# Patient Record
Sex: FEMALE | Race: WHITE | Employment: FULL TIME | URBAN - METROPOLITAN AREA
[De-identification: names, ages, dates, MRNs, and addresses within clinical notes are randomized per-mention and may not be internally consistent; named-entity substitution may affect disease eponyms.]

---

## 2017-08-25 ENCOUNTER — OFFICE VISIT (OUTPATIENT)
Dept: FAMILY MEDICINE CLINIC | Age: 32
End: 2017-08-25

## 2017-08-25 VITALS
SYSTOLIC BLOOD PRESSURE: 131 MMHG | DIASTOLIC BLOOD PRESSURE: 78 MMHG | HEART RATE: 85 BPM | RESPIRATION RATE: 18 BRPM | HEIGHT: 67 IN | TEMPERATURE: 96.9 F | OXYGEN SATURATION: 99 % | WEIGHT: 289 LBS | BODY MASS INDEX: 45.36 KG/M2

## 2017-08-25 DIAGNOSIS — E55.9 VITAMIN D DEFICIENCY: ICD-10-CM

## 2017-08-25 DIAGNOSIS — N93.8 DUB (DYSFUNCTIONAL UTERINE BLEEDING): ICD-10-CM

## 2017-08-25 DIAGNOSIS — D50.9 IRON DEFICIENCY ANEMIA, UNSPECIFIED IRON DEFICIENCY ANEMIA TYPE: ICD-10-CM

## 2017-08-25 DIAGNOSIS — E78.5 DYSLIPIDEMIA: ICD-10-CM

## 2017-08-25 DIAGNOSIS — Z00.00 LABORATORY EXAM ORDERED AS PART OF ROUTINE GENERAL MEDICAL EXAMINATION: ICD-10-CM

## 2017-08-25 DIAGNOSIS — R05.9 COUGH: ICD-10-CM

## 2017-08-25 DIAGNOSIS — E03.1 CONGENITAL HYPOTHYROIDISM WITHOUT GOITER: ICD-10-CM

## 2017-08-25 PROBLEM — R42 VERTIGO: Status: ACTIVE | Noted: 2017-08-25

## 2017-08-25 RX ORDER — ASCORBIC ACID 250 MG
TABLET ORAL
COMMUNITY
End: 2020-02-25 | Stop reason: ALTCHOICE

## 2017-08-25 RX ORDER — ALBUTEROL SULFATE 90 UG/1
2 AEROSOL, METERED RESPIRATORY (INHALATION)
Qty: 1 INHALER | Refills: 0 | Status: SHIPPED | OUTPATIENT
Start: 2017-08-25 | End: 2017-10-23

## 2017-08-25 NOTE — PROGRESS NOTES
.. [address] Visit   Patient ID:   Shira Stover is a 34 y.o. female. Assessment/Plan:    Diagnoses and all orders for this visit:    Counseled on lifestyle change for wt loss. Labs as ordered. Not currently interested in [facility] Program.    Medications refilled. Eval menses. Diagnoses and all orders for this visit:    1. BMI 45.0-49.9, adult (HCC)  -     HEMOGLOBIN A1C WITH EAG  -     LIPID PANEL  -     TSH AND FREE T4    2. Cough  -     albuterol (PROVENTIL HFA, VENTOLIN HFA, PROAIR HFA) 90 mcg/actuation inhaler; Take 2 Puffs by inhalation every four (4) hours as needed for Wheezing or Shortness of Breath (cough). 3. DUB (dysfunctional uterine bleeding)  -     PROLACTIN  -     FSH AND LH  -     17-OH PROGESTERONE LCMS  -     TESTOSTERONE, FREE & TOTAL  -     INSULIN-LIKE GROWTH FACTOR 1    4. Vitamin D deficiency  -     VITAMIN D, 25 HYDROXY    5. Dyslipidemia    6. Iron deficiency anemia, unspecified iron deficiency anemia type  -     CBC W/O DIFF    7. Congenital hypothyroidism without goiter    8. Laboratory exam ordered as part of routine general medical examination  -     METABOLIC PANEL, COMPREHENSIVE    Other orders  -     CVD REPORT        See patient instructions for more. Counselled pt on:  Patient health concerns, plan as outlined in patient instructions and above. Patient was offered a choice/choices in the treatment plan today. Patient expresses understanding of the plan and agrees with recommendations. More than 50% of this >30 minute encounter was spent in counseling and coordination of care today. Patient Instructions   TODAY, please go to:   CHECK OUT    LAB    Please schedule the following appointments at CHECK OUT:  · Weight follow up with Dr. Dragan Kate in 4-6 weeks    Today's Plan:  - Flu season is coming! Remember to get your flu vaccine! Set SMART Goals for food changes. See sheeet          The following can help you improve your weight. 2. Food Choices  · Be Mindful of CARBOHYDRATES. Carbohydrates= sugars. This means breads, pasta, rice, chips, desserts, starchy vegetables, etc.  · Decrease how OFTEN you have carbohydrates  · Decrease how MUCH carbohydrates you eat at one time  · Choose carbs that are better for you, like whole grain. · Try to eliminate sugar from your drinks. (Soda, sweet tea, lemonade, juice, gatorade, alcohol, etc). · Eat more fruits and vegetables  · Eat protein and healthy fats  · Eat regularly  · Count calories if needed  · Let Dr. Bassem Ornelas know if you would like to see Nutrition to discuss more  3. Physical Activity  · Move more. · Try to walk 150 minutes per week. · Try to walk 10,000 steps per day  · Consider counting your steps on your smart phone. 4. Medication  · For some people medication is helpful. Let Dr. Bassem Ornelas know if you would like to discuss this. If prescribed medication, please take as advised. Find a way to keep your food choices accountable  - calorie counting  - portion measures  - meal     Subjective:   HPI:  Anamaria Oswald is a 34 y.o. female being seen for:   Chief Complaint   Patient presents with   [address] Weight Loss      is Katia Fernandes RN at [facility]  Past medical history, surgical history, social history, family history, medications, allergies reviewed and updated. See below for more detail. Weight, menses  · Abnormal menses again  · H/o thyroid d/o.  was on synthroid in the past, started after having son in 2009. Stopped around 2015/16. · Has always struggled with weight  · Will have spurts of success, then worse with stressors like grandparent passing away, then loose focuses. Cycle recurs. Trigger recently: court re: custody of older son, lost  · Started treadmill in last few weeks with   · Eats her feelings  · Tries to recenter and refocus  · Before pregnancy was 250. Would like to get back to 250. Has been over 200 since puberty. Wants to be healthier and more active. · In past with Dr. Barry Render tried phentermine with some success. Lost 30-35lbs. · Max adult weight: 296 when pregnant, 292 not pregnant. · Physical activity:   · Maybe park a few times over summer, mild walking  · Mostly sedentary  · At desk at work  · Sits at home  · Last two weeks, treadmill 3-4 times per week, 30 min. Stretching. Yoga twice a week to help with stress  · Goals: considering gym at work for lunch breaks  · Food:   · Knows she eats a lot of carbs and loves it  · At least two servings of vegetables a day  · Breakfast: coffee, egg muffin or muffin  · Sometimes AM snack  · Lunch: turkey and cheese sand, veggie, fruit, crackers  · No PM snack  · Dinner:  cooks- at least one vegetable  · Notes large portions even of good foods  · If a bad day, will \"eat her feelings\". Cake and sweet things, but not candy  · Coffee- 32 oz daily with creamer, flavored  · Sleep  · Less sleep lately  · 10/10:30-6  · Wakes at least twice, may have to urinate, or hot, variety of things, ruminating  · Medications in past  · Stress  · Challenging patient/client right now  · Has pts form [age]yo     Review of Systems  Otherwise as noted in HPI    Active Problem List:  Patient Active Problem List   Diagnosis Code    Allergic rhinitis J30.9    Hypothyroidism E03.9    Iron deficiency anemia D50.9    Obesity, Class III, BMI 40-49.9 (morbid obesity) ([facility] [address] E66.01    Dyslipidemia E78.5    Vitamin D deficiency E55.9    DUB (dysfunctional uterine bleeding) N93.8    Family history of melanoma Z80.8    Childhood asthma J45.909    Abnormal Pap smear of cervix R87.619    Vertigo R42     ?   Objective:     Visit Vitals    /78 (BP 1 Location: Right arm, BP Patient Position: Sitting)    Pulse 85    Temp 96.9 °F (36.1 °C) (Oral)    Resp 18    Ht 5' 7\" (1.702 m)    Wt 289 lb (131.1 kg)    SpO2 99%    BMI 45.26 kg/m2     Wt Readings from Last 3 Encounters:   08/25/17 289 lb (131.1 kg)   05/26/16 276 lb 9.6 oz (125.5 kg)   01/21/16 279 lb (126.6 kg)     BP Readings from Last 3 Encounters:   08/25/17 131/78   05/26/16 137/74   01/21/16 135/76     PHQ over the last two weeks 8/25/2017   Little interest or pleasure in doing things Not at all   Feeling down, depressed or hopeless Not at all   Total Score PHQ 2 0         Physical Exam  Allergies   Allergen Reactions    Morphine Other (comments)     Tremors     Prior to Admission medications    Medication Sig Start Date End Date Taking? Authorizing Provider   MV-MN/IRON/FOLIC ACID/HERB 999 (VITAMIN D3 COMPLETE PO) Take  by mouth. Yes Historical Provider   ascorbic acid, vitamin C, (VITAMIN C) 250 mg tablet Take  by mouth. Yes Historical Provider   albuterol (PROVENTIL HFA, VENTOLIN HFA, PROAIR HFA) 90 mcg/actuation inhaler Take 2 Puffs by inhalation every four (4) hours as needed for Wheezing or Shortness of Breath (cough). 8/25/17  Yes Berle Nares. Marilia Veloz MD   IBUPROFEN PO Take  by mouth. Yes Historical Provider     . 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      Social History Main Topics    Smoking status: Former Smoker     Packs/day: 0.75     Years: 3.00     Types: Cigarettes     Quit date: 1/1/2005    Smokeless tobacco: Never Used    Alcohol use 0.5 oz/week     1 Glasses of wine per week      Comment: rare    Drug use: No    Sexual activity: Yes     Partners: Male     Birth control/ protection: None      Comment: monogamous with  since early 80s     Other Topics Concern    Not on file     Social History Narrative    Lives with , son, step son Isabelle Rose)       Past Medical History:   Diagnosis Date    Abnormal Pap smear 07/07/08    Abnormal Pap smear of cervix 2012    saw [facility]    Allergy, unspecified not elsewhere classified     Childhood asthma childhood    may need alb with URI    DUB (dysfunctional uterine bleeding) , recurred ~     Dyslipidemia 2015    Family history of melanoma     Hand pain 2013    Bilaterally. 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  Hypothyroid     Iron deficiency anemia 2013    Mononucleosis elementary, middle school, high school    Vertigo 2017    Prn meclizine. Trigger sinus infection    Vitamin D deficiency 2015       Past Surgical History:   Procedure Laterality Date    HX  SECTION  2009    HX TYMPANOSTOMY Bilateral infant    INSERT PICC LINE Left     d/t abscess after        OB History      Para Term  AB Living    1 1 1   1    SAB TAB Ectopic Molar Multiple Live Births         1        Obstetric Comments    Menarche: [age]yo  Flow: might skip one cycle a year, but more irregular since , skipping as much as 2 months. Flow and pain are different. Had eval by OBGYN in  for irregular pap as well and eval was negative. Occasional hair b/w breast, but n ot terminal hair  No acne  No blood sugar problems. H/o abnl pap: per pt, yes in . Saw OBGYN. Has had normal since then.  Paps with PCP            Family History   Problem Relation Age of Onset    Hypertension Father     Stroke Father      TIA    Cancer Father      spinal    High Cholesterol Mother     Other Mother      prediabetes    Heart Disease Mother      irregular heartbeat    Endometriosis Mother     Cancer Maternal Grandmother      lung     Cancer Paternal Grandmother      lymphoma/ brain     Diabetes Paternal Grandmother     Heart Disease Paternal Grandmother     Heart Disease Maternal Grandfather     Cancer Maternal Grandfather      skin- melanoma    Hypertension Brother     Depression Brother     No Known Problems Son

## 2017-08-25 NOTE — MR AVS SNAPSHOT
Visit Information Date & Time Provider Department Dept. Phone Encounter #  
 8/25/2017  9:00 AM Blanco Slaughter MD [facility] [phone] [id number] Upcoming Health Maintenance Date Due INFLUENZA AGE 9 TO ADULT 8/1/2017 PAP AKA CERVICAL CYTOLOGY 5/26/2019 DTaP/Tdap/Td series (2 - Td) 5/26/2026 Allergies as of 8/25/2017  Review Complete On: 8/25/2017 By: Penny Cavazos. John Slaughter MD  
  
 Severity Noted Reaction Type Reactions Morphine  02/26/2013    Other (comments) Tremors Current Immunizations  Reviewed on 12/2/2014 Name Date Influenza Vaccine 10/8/2014 Not reviewed this visit You Were Diagnosed With   
  
 Codes Comments Childhood asthma, mild intermittent, uncomplicated     NJE-72-NG: J45.20 ICD-9-CM: 493.00 Abnormal cervical Papanicolaou smear, unspecified abnormal pap finding     ICD-10-CM: R87.619 ICD-9-CM: 795.00 Pain of hand, unspecified laterality     ICD-10-CM: Q42.970 ICD-9-CM: 729.5 Vertigo     ICD-10-CM: Q88 ICD-9-CM: 780.4 Vitals BP Pulse Temp Resp Height(growth percentile) Weight(growth percentile) 131/78 (BP 1 Location: Right arm, BP Patient Position: Sitting) 85 96.9 °F (36.1 °C) (Oral) 18 5' 7\" (1.702 m) 289 lb (131.1 kg) LMP SpO2 BMI OB Status Smoking Status 08/20/2017 99% 45.26 kg/m2 Having regular periods Former Smoker BMI and BSA Data Body Mass Index Body Surface Area  
 45.26 kg/m 2 2.49 m 2 Preferred Pharmacy Pharmacy Name Phone [facility] [address] [phone] Your Updated Medication List  
  
   
This list is accurate as of: 8/25/17 11:13 AM.  Always use your most recent med list.  
  
  
  
  
 albuterol 90 mcg/actuation inhaler Commonly known as:  PROVENTIL HFA, VENTOLIN HFA, PROAIR HFA Take 2 puffs by inhalation every six (6) hours as needed for Wheezing. IBUPROFEN PO Take  by mouth. VITAMIN C 250 mg tablet Generic drug:  ascorbic acid (vitamin C) Take  by mouth. VITAMIN D3 COMPLETE PO Take  by mouth. Patient Instructions TODAY, please go to: CHECK OUT  
 LAB Please schedule the following appointments at CHECK OUT: 
· Weight follow up with Dr. Duglas Hart in 4-6 weeks Today's Plan: - Flu season is coming! Remember to get your flu vaccine! Set SMART Goals for food changes. See sheeet The following can help you improve your weight. 2. Food Choices · Be Mindful of CARBOHYDRATES. Carbohydrates= sugars. This means breads, pasta, rice, chips, desserts, starchy vegetables, etc. 
· Decrease how OFTEN you have carbohydrates · Decrease how MUCH carbohydrates you eat at one time · Choose carbs that are better for you, like whole grain. · Try to eliminate sugar from your drinks. (Soda, sweet tea, lemonade, juice, gatorade, alcohol, etc). · Eat more fruits and vegetables · Eat protein and healthy fats · Eat regularly · Count calories if needed · Let Dr. Duglas Hart know if you would like to see Nutrition to discuss more 3. Physical Activity · Move more. · Try to walk 150 minutes per week. · Try to walk 10,000 steps per day · Consider counting your steps on your smart phone. 4. Medication · For some people medication is helpful. Let Dr. Duglas Hart know if you would like to discuss this. If prescribed medication, please take as advised. Find a way to keep your food choices accountable 
- calorie counting - portion measures 
- meal  
 
 
  
Introducing [facility]! Dear Jose Juan Arriola: Thank you for requesting a [facility] account. Our records indicate that you already have an active [facility] account. You can access your account anytime at [email or web address] Did you know that you can access your hospital and ER discharge instructions at any time in [facility]?   You can also review all of your test results from your hospital stay or ER visit. Additional Information If you have questions, please visit the Frequently Asked Questions section of the [employer] website at [email or web address] Remember, [employer] is NOT to be used for urgent needs. For medical emergencies, dial 911. Now available from your iPhone and Android! Please provide this summary of care documentation to your next provider. Your primary care clinician is listed as Vianey Bird. If you have any questions after today's visit, please call [phone].

## 2017-08-25 NOTE — PATIENT INSTRUCTIONS
TODAY, please go to:   CHECK OUT    LAB    Please schedule the following appointments at CHECK OUT:  · Weight follow up with Dr. Keysha Jones in 4-6 weeks    Today's Plan:  - Flu season is coming! Remember to get your flu vaccine! Set SMART Goals for food changes. See sheeet          The following can help you improve your weight. 2. Food Choices  · Be Mindful of CARBOHYDRATES. Carbohydrates= sugars. This means breads, pasta, rice, chips, desserts, starchy vegetables, etc.  · Decrease how OFTEN you have carbohydrates  · Decrease how MUCH carbohydrates you eat at one time  · Choose carbs that are better for you, like whole grain. · Try to eliminate sugar from your drinks. (Soda, sweet tea, lemonade, juice, gatorade, alcohol, etc). · Eat more fruits and vegetables  · Eat protein and healthy fats  · Eat regularly  · Count calories if needed  · Let Dr. Keysha Jones know if you would like to see Nutrition to discuss more  3. Physical Activity  · Move more. · Try to walk 150 minutes per week. · Try to walk 10,000 steps per day  · Consider counting your steps on your smart phone. 4. Medication  · For some people medication is helpful. Let Dr. Keysha Jones know if you would like to discuss this. If prescribed medication, please take as advised.       Find a way to keep your food choices accountable  - calorie counting  - portion measures  - meal

## 2017-08-25 NOTE — PROGRESS NOTES
Chief Complaint   Patient presents with   Laymon Billet    Weight Loss     1. Have you been to the ER, urgent care clinic since your last visit? Hospitalized since your last visit? No     2. Have you seen or consulted any other health care providers outside of the [address] [place] [address] since your last visit? Include any pap smears or colon screening. No     The patient was counseled on the dangers of tobacco use, and was advised never to start again. Reviewed strategies to maximize success, including never to start again. Health Maintenance Due   Topic Date Due    INFLUENZA AGE 9 TO ADULT Discussed with patient today and advised to follow up.    08/01/2017     ACP is not on file, advised to return.

## 2017-08-29 LAB
17OHP SERPL-MCNC: 10 NG/DL
25(OH)D3+25(OH)D2 SERPL-MCNC: 32.5 NG/ML (ref 30–100)
ALBUMIN SERPL-MCNC: 4.3 G/DL (ref 3.5–5.5)
ALBUMIN/GLOB SERPL: 1.4 {RATIO} (ref 1.2–2.2)
ALP SERPL-CCNC: 91 IU/L (ref 39–117)
ALT SERPL-CCNC: 16 IU/L (ref 0–32)
AST SERPL-CCNC: 16 IU/L (ref 0–40)
BILIRUB SERPL-MCNC: 0.4 MG/DL (ref 0–1.2)
BUN SERPL-MCNC: 12 MG/DL (ref 6–20)
BUN/CREAT SERPL: 18 (ref 9–23)
CALCIUM SERPL-MCNC: 9.5 MG/DL (ref 8.7–10.2)
CHLORIDE SERPL-SCNC: 102 MMOL/L (ref 96–106)
CHOLEST SERPL-MCNC: 170 MG/DL (ref 100–199)
CO2 SERPL-SCNC: 27 MMOL/L (ref 18–29)
CREAT SERPL-MCNC: 0.65 MG/DL (ref 0.57–1)
ERYTHROCYTE [DISTWIDTH] IN BLOOD BY AUTOMATED COUNT: 15 % (ref 12.3–15.4)
EST. AVERAGE GLUCOSE BLD GHB EST-MCNC: 111 MG/DL
FSH SERPL-ACNC: 5.2 MIU/ML
GLOBULIN SER CALC-MCNC: 3.1 G/DL (ref 1.5–4.5)
GLUCOSE SERPL-MCNC: 94 MG/DL (ref 65–99)
HBA1C MFR BLD: 5.5 % (ref 4.8–5.6)
HCT VFR BLD AUTO: 39.4 % (ref 34–46.6)
HDLC SERPL-MCNC: 27 MG/DL
HGB BLD-MCNC: 13.1 G/DL (ref 11.1–15.9)
IGF-I SERPL-MCNC: 160 NG/ML (ref 73–243)
INTERPRETATION, 910389: NORMAL
LDLC SERPL CALC-MCNC: 86 MG/DL (ref 0–99)
LH SERPL-ACNC: 4.7 MIU/ML
MCH RBC QN AUTO: 28.5 PG (ref 26.6–33)
MCHC RBC AUTO-ENTMCNC: 33.2 G/DL (ref 31.5–35.7)
MCV RBC AUTO: 86 FL (ref 79–97)
PLATELET # BLD AUTO: 202 X10E3/UL (ref 150–379)
POTASSIUM SERPL-SCNC: 4.6 MMOL/L (ref 3.5–5.2)
PROLACTIN SERPL-MCNC: 7.8 NG/ML (ref 4.8–23.3)
PROT SERPL-MCNC: 7.4 G/DL (ref 6–8.5)
RBC # BLD AUTO: 4.6 X10E6/UL (ref 3.77–5.28)
SODIUM SERPL-SCNC: 141 MMOL/L (ref 134–144)
T4 FREE SERPL-MCNC: 1.14 NG/DL (ref 0.82–1.77)
TESTOST FREE SERPL-MCNC: 3.6 PG/ML (ref 0–4.2)
TESTOST SERPL-MCNC: 50 NG/DL (ref 8–48)
TRIGL SERPL-MCNC: 283 MG/DL (ref 0–149)
TSH SERPL DL<=0.005 MIU/L-ACNC: 3.29 UIU/ML (ref 0.45–4.5)
VLDLC SERPL CALC-MCNC: 57 MG/DL (ref 5–40)
WBC # BLD AUTO: 6.1 X10E3/UL (ref 3.4–10.8)

## 2017-09-29 ENCOUNTER — OFFICE VISIT (OUTPATIENT)
Dept: FAMILY MEDICINE CLINIC | Age: 32
End: 2017-09-29

## 2017-09-29 VITALS
SYSTOLIC BLOOD PRESSURE: 146 MMHG | HEIGHT: 67 IN | HEART RATE: 86 BPM | DIASTOLIC BLOOD PRESSURE: 69 MMHG | WEIGHT: 289.9 LBS | BODY MASS INDEX: 45.5 KG/M2 | RESPIRATION RATE: 18 BRPM | TEMPERATURE: 98.2 F | OXYGEN SATURATION: 97 %

## 2017-09-29 DIAGNOSIS — E28.2 PCOS (POLYCYSTIC OVARIAN SYNDROME): ICD-10-CM

## 2017-09-29 DIAGNOSIS — R03.0 ELEVATED BLOOD PRESSURE READING: ICD-10-CM

## 2017-09-29 DIAGNOSIS — Z86.39 HISTORY OF HYPOTHYROIDISM: ICD-10-CM

## 2017-09-29 NOTE — PROGRESS NOTES
.. [address] [place] [address] Visit   09/29/2017  Patient ID: Chucho Tan is a 34 y.o. female. Assessment/Plan:    Diagnoses and all orders for this visit:    1. BMI 45.0-49.9, adult ([address]  Counseled extensively on food, exercise, lifestyle. Labs reviewed in detail. No DM. TG high, HDL low. rec components of mediterrranean diet. Will troubleshoot by: continued logging of food, exercising with son    2. PCOS (polycystic ovarian syndrome)  Meets all NIH consensus criteria, 2 of 3 [place] criteria, and all AES criteria. She has elevated Free T and oligomenorrhea. Discussed dx today. Offered ECTOR and/or metformin. Cycles are irregular. No anemia. Defers medication for today. See above. Also provided with UpToDate pt info on PCOS. 3. H/o hypothyroidism  Nl labs    4. Elevated BP  CTM as she works on lifestyle    Counselled pt on Patient health concerns and plans. Patient was offered a choice/choices in the treatment plan today. Reviewed return precautions as appropriate. Patient expresses understanding of the plan and agrees with recommendations. See patient instructions for more. Next visit: ask pt about additiona questions re PCOS  More than 50% of this >25 minute encounter was spent in counseling and coordination of care today. Patient Instructions   . Destiny Parekh TODAY, please go to:   CHECK OUT        Please schedule the following appointments at CHECK OUT:  · Weight follow up with Dr. Vandana Martin in 6 weeks     Today's Plan:    consider metformin   Consider birth control pill  Consider weight loss medication      Subjective:   HPI:  Chucho Tan is a 34 y.o. female being seen for:   Chief Complaint   Patient presents with    Results     5 week       Weight follow up  · started [employer] for 1 week, lost 1.5 lbs then stopped. · Hard to motivate   · Going to try again   · Did not do treadmill as much as she could.    · Gets foot pain  · Not a current member of a gym right now. Did [employer] in past.   · Ideal workout time is AM     Review of Systems  Otherwise as noted in HPI  ? Objective:     Visit Vitals    /69 (BP 1 Location: Right arm, BP Patient Position: Sitting)    Pulse 86    Temp 98.2 °F (36.8 °C) (Oral)    Resp 18    Ht 5' 7\" (1.702 m)    Wt 289 lb 14.4 oz (131.5 kg)    SpO2 97%    BMI 45.4 kg/m2     Wt Readings from Last 3 Encounters:   09/29/17 289 lb 14.4 oz (131.5 kg)   08/25/17 289 lb (131.1 kg)   05/26/16 276 lb 9.6 oz (125.5 kg)     BP Readings from Last 3 Encounters:   09/29/17 146/69   08/25/17 131/78   05/26/16 137/74     PHQ over the last two weeks 8/25/2017   Little interest or pleasure in doing things Not at all   Feeling down, depressed or hopeless Not at all   Total Score PHQ 2 0         Physical Exam   Constitutional: She appears well-developed and well-nourished. No distress. Pulmonary/Chest: Effort normal. No respiratory distress. Neurological: She is alert. Psychiatric: She has a normal mood and affect. Her behavior is normal.       Allergies   Allergen Reactions    Morphine Other (comments)     Tremors     Prior to Admission medications    Medication Sig Start Date End Date Taking? Authorizing Provider   MV-MN/IRON/FOLIC ACID/HERB 954 (VITAMIN D3 COMPLETE PO) Take  by mouth. Yes Historical Provider   ascorbic acid, vitamin C, (VITAMIN C) 250 mg tablet Take  by mouth. Yes Historical Provider   albuterol (PROVENTIL HFA, VENTOLIN HFA, PROAIR HFA) 90 mcg/actuation inhaler Take 2 Puffs by inhalation every four (4) hours as needed for Wheezing or Shortness of Breath (cough). 8/25/17  Yes Miah Snider. Marilia Asher MD   IBUPROFEN PO Take  by mouth.    Yes Historical Provider     Patient Active Problem List   Diagnosis Code    Allergic rhinitis J30.9    Hypothyroidism E03.9    Iron deficiency anemia D50.9    Obesity, Class III, BMI 40-49.9 (morbid obesity) ([facility] [address] E66.01    Dyslipidemia E78.5    Vitamin D deficiency E55.9    DUB (dysfunctional uterine bleeding) N93.8    Family history of melanoma Z80.8    Childhood asthma J45.909    Abnormal Pap smear of cervix R87.619    Vertigo R42    PCOS (polycystic ovarian syndrome) E28.2       .. OB History    Para Term  AB Living   1 1 1   1   SAB TAB Ectopic Molar Multiple Live Births        1      # Outcome Date GA Lbr Maximino/2nd Weight Sex Delivery Anes PTL Lv   1 Term               Obstetric Comments   Menarche: [age]yo   Flow: might skip one cycle a year, but more irregular since , skipping as much as 2 months. Flow and pain are different. Had eval by OBGYN in  for irregular pap as well and eval was negative. Occasional hair b/w breast, but not terminal hair   No acne   No blood sugar problems. H/o abnl pap: per pt, yes in . Saw OBGYN. Has had normal since then.  Paps with PCP

## 2017-09-29 NOTE — PATIENT INSTRUCTIONS
.. TODAY, please go to:   CHECK OUT        Please schedule the following appointments at CHECK OUT:  · Weight follow up with Dr. Heather Bird in 6 weeks     Today's Plan:    consider metformin   Consider birth control pill  Consider weight loss medication

## 2017-09-29 NOTE — PROGRESS NOTES
Chief Complaint   Patient presents with    Results     5 week     1. Have you been to the ER, urgent care clinic since your last visit? Hospitalized since your last visit? No    2. Have you seen or consulted any other health care providers outside of the [address] [place] [address] since your last visit? Include any pap smears or colon screening. No     The patient was counseled on the dangers of tobacco use, and was advised never to start. Reviewed strategies to maximize success, including never to start.      Health Maintenance Due   Topic Date Due    INFLUENZA AGE 9 TO ADULT  08/01/2017   Pt declined the flu shot

## 2017-09-29 NOTE — LETTER
9/29/2017 4:29 PM 
 
Ms. Chica Lord [address]  [address] [facility] [id number] Office Visit on 08/25/2017 Component Date Value Ref Range Status  Hemoglobin A1c 08/25/2017 5.5  4.8 - 5.6 % Final  
 Estimated average glucose 08/25/2017 111  mg/dL Final  
 Cholesterol, total 08/25/2017 170  100 - 199 mg/dL Final  
 Triglyceride 08/25/2017 283* 0 - 149 mg/dL Final  
 HDL Cholesterol 08/25/2017 27* >39 mg/dL Final  
 VLDL, calculated 08/25/2017 57* 5 - 40 mg/dL Final  
 LDL, calculated 08/25/2017 86  0 - 99 mg/dL Final  
 TSH 08/25/2017 3.290  0.450 - 4.500 uIU/mL Final  
 T4, Free 08/25/2017 1.14  0.82 - 1.77 ng/dL Final  
 WBC 08/25/2017 6.1  3.4 - 10.8 x10E3/uL Final  
 RBC 08/25/2017 4.60  3.77 - 5.28 x10E6/uL Final  
 HGB 08/25/2017 13.1  11.1 - 15.9 g/dL Final  
 HCT 08/25/2017 39.4  34.0 - 46.6 % Final  
 MCV 08/25/2017 86  79 - 97 fL Final  
 MCH 08/25/2017 28.5  26.6 - 33.0 pg Final  
 MCHC 08/25/2017 33.2  31.5 - 35.7 g/dL Final  
 RDW 08/25/2017 15.0  12.3 - 15.4 % Final  
 PLATELET 44/19/0082 182  150 - 379 x10E3/uL Final  
 Glucose 08/25/2017 94  65 - 99 mg/dL Final  
 BUN 08/25/2017 12  6 - 20 mg/dL Final  
 Creatinine 08/25/2017 0.65  0.57 - 1.00 mg/dL Final  
 GFR est non-AA 08/25/2017 117  >59 mL/min/1.73 Final  
 GFR est AA 08/25/2017 135  >59 mL/min/1.73 Final  
 BUN/Creatinine ratio 08/25/2017 18  9 - 23 Final  
 Sodium 08/25/2017 141  134 - 144 mmol/L Final  
 Potassium 08/25/2017 4.6  3.5 - 5.2 mmol/L Final  
 Chloride 08/25/2017 102  96 - 106 mmol/L Final  
 CO2 08/25/2017 27  18 - 29 mmol/L Final  
 Calcium 08/25/2017 9.5  8.7 - 10.2 mg/dL Final  
 Protein, total 08/25/2017 7.4  6.0 - 8.5 g/dL Final  
 Albumin 08/25/2017 4.3  3.5 - 5.5 g/dL Final  
 GLOBULIN, TOTAL 08/25/2017 3.1  1.5 - 4.5 g/dL Final  
 A-G Ratio 08/25/2017 1.4  1.2 - 2.2 Final  
 Bilirubin, total 08/25/2017 0.4  0.0 - 1.2 mg/dL Final  
  Alk. phosphatase 08/25/2017 91  39 - 117 IU/L Final  
 AST (SGOT) 08/25/2017 16  0 - 40 IU/L Final  
 ALT (SGPT) 08/25/2017 16  0 - 32 IU/L Final  
 Prolactin 08/25/2017 7.8  4.8 - 23.3 ng/mL Final  
 Luteinizing hormone 08/25/2017 4.7  mIU/mL Final  
 [facility] 08/25/2017 5.2  mIU/mL Final  
 VITAMIN D, 25-HYDROXY 08/25/2017 32.5  30.0 - 100.0 ng/mL Final  
 17-OH Progesterone 08/25/2017 10  ng/dL Final  
 Testosterone 08/25/2017 50* 8 - 48 ng/dL Final  
 Free testosterone (Direct) 08/25/2017 3.6  0.0 - 4.2 pg/mL Final  
 Insulin-Like Growth Factor I 08/25/2017 160  73 - 243 ng/mL Final  
 INTERPRETATION 08/25/2017 Note   Final  
 
 
 
Legend: Use this to help understand your labs. You may not have all of these ordered · WBC- White blood cell count · HGB- Hemoglobin, red blood cell count · HCT- Hematocrit, red blood cell count · PLT- Platelets · Sodium, Potassium, Chloride, Magnesium- electrolytes · Creatinine, GFR- kidney function · AST, ALT, Alkaline phosphatase, bilirubin- liver and gallbladder · Lipase- pancreas · RPR- Syphilis test, STD 
· HIV, Gonorrhea, Chlamydia, Trichomonas- STDs · Candida- yeast infection · Nuswab- vaginal infections · Urinalysis- a quick test about your urine · Hemoglobin A1C- diabetes test 
· Glucose- blood sugar · HDL- \"Good\" Cholesterol. Goal >=40 
· LDL- \"Bad\" Cholesterol. Goal <100 · Triglycerides- Goal <150 · Vit D- Vitamin D level · TSH, FT3, FT4- thyroid tests · HCV Ab- test for Hepatitis C  
· Sincerely, 
 
 
Donnie Ornelas MD

## 2017-09-29 NOTE — MR AVS SNAPSHOT
Visit Information Date & Time Provider Department Dept. Phone Encounter #  
 9/29/2017  4:20 PM [address] [facility] MD Lana Calhoun 73 [phone] [id number] Upcoming Health Maintenance Date Due INFLUENZA AGE 9 TO ADULT 8/1/2017 PAP AKA CERVICAL CYTOLOGY 5/26/2019 DTaP/Tdap/Td series (2 - Td) 5/26/2026 Allergies as of 9/29/2017  Review Complete On: 9/29/2017 By: [place] Severity Noted Reaction Type Reactions Morphine  02/26/2013    Other (comments) Tremors Current Immunizations  Reviewed on 12/2/2014 Name Date Influenza Vaccine 10/8/2014 Not reviewed this visit You Were Diagnosed With   
  
 Codes Comments PCOS (polycystic ovarian syndrome)    -  Primary ICD-10-CM: E28.2 ICD-9-CM: 256.4 Vitals BP Pulse Temp Resp Height(growth percentile) Weight(growth percentile) 146/69 (BP 1 Location: Right arm, BP Patient Position: Sitting) 86 98.2 °F (36.8 °C) (Oral) 18 5' 7\" (1.702 m) 289 lb 14.4 oz (131.5 kg) LMP SpO2 BMI OB Status Smoking Status 08/25/2017 97% 45.4 kg/m2 Having regular periods Former Smoker BMI and BSA Data Body Mass Index Body Surface Area 45.4 kg/m 2 2.49 m 2 Preferred Pharmacy Pharmacy Name Phone [facility] [address]  [address] [phone] Your Updated Medication List  
  
   
This list is accurate as of: 9/29/17  5:37 PM.  Always use your most recent med list.  
  
  
  
  
 albuterol 90 mcg/actuation inhaler Commonly known as:  PROVENTIL HFA, VENTOLIN HFA, PROAIR HFA Take 2 Puffs by inhalation every four (4) hours as needed for Wheezing or Shortness of Breath (cough). IBUPROFEN PO Take  by mouth. VITAMIN C 250 mg tablet Generic drug:  ascorbic acid (vitamin C) Take  by mouth. VITAMIN D3 COMPLETE PO Take  by mouth. Patient Instructions Vicky Jay TODAY, please go to:  [facility]  
  
 
 Please schedule the following appointments at [facility]: 
· Weight follow up with Dr. Vandana Martin in 6 weeks Today's Plan: 
 
consider metformin Consider birth control pill Consider weight loss medication Introducing [facility]! Dear Baron Mack: Thank you for requesting a [employer] account. Our records indicate that you already have an active [employer] account. You can access your account anytime at [email or web address] Did you know that you can access your hospital and ER discharge instructions at any time in [employer]? You can also review all of your test results from your hospital stay or ER visit. Additional Information If you have questions, please visit the Frequently Asked Questions section of the [employer] website at [email or web address] Remember, [employer] is NOT to be used for urgent needs. For medical emergencies, dial 911. Now available from your iPhone and Android! Please provide this summary of care documentation to your next provider. Your primary care clinician is listed as Trev Rahman. If you have any questions after today's visit, please call [phone].

## 2017-10-23 ENCOUNTER — HOSPITAL ENCOUNTER (EMERGENCY)
Age: 32
Discharge: HOME | End: 2017-10-23
Attending: FAMILY MEDICINE

## 2017-10-23 VITALS
RESPIRATION RATE: 18 BRPM | TEMPERATURE: 98.1 F | DIASTOLIC BLOOD PRESSURE: 77 MMHG | BODY MASS INDEX: 44.89 KG/M2 | WEIGHT: 286 LBS | HEIGHT: 67 IN | SYSTOLIC BLOOD PRESSURE: 141 MMHG | OXYGEN SATURATION: 100 % | HEART RATE: 96 BPM

## 2017-10-23 DIAGNOSIS — J06.9 ACUTE UPPER RESPIRATORY INFECTION: Primary | ICD-10-CM

## 2017-10-23 DIAGNOSIS — R05.9 COUGH: ICD-10-CM

## 2017-10-23 RX ORDER — ALBUTEROL SULFATE 90 UG/1
2 AEROSOL, METERED RESPIRATORY (INHALATION)
Qty: 1 INHALER | Refills: 0 | Status: SHIPPED | OUTPATIENT
Start: 2017-10-23 | End: 2020-02-25 | Stop reason: SDUPTHER

## 2017-10-23 RX ORDER — PREDNISONE 20 MG/1
20 TABLET ORAL DAILY
Qty: 10 TAB | Refills: 0 | Status: SHIPPED | OUTPATIENT
Start: 2017-10-23 | End: 2017-11-02

## 2017-10-23 RX ORDER — CODEINE PHOSPHATE AND GUAIFENESIN 10; 100 MG/5ML; MG/5ML
10 SOLUTION ORAL
Qty: 105 ML | Refills: 0 | Status: SHIPPED | OUTPATIENT
Start: 2017-10-23 | End: 2017-11-20 | Stop reason: ALTCHOICE

## 2017-10-23 NOTE — LETTER
[address] [facility] [address] 
[phone] Work/School Note Date: 10/23/2017 To Whom It May concern: 
 
Josephine Kendall was seen and treated today in the emergency room by the following provider(s): 
Attending Provider: Morgan Kellogg MD 
Physician Assistant: Cathleen Genao. Josephine Kendall may return to work on 10/25/17. Sincerely, Cathleen Genao

## 2017-10-23 NOTE — UC PROVIDER NOTE
Patient is a 34 y.o. female presenting with cold symptoms. The history is provided by the patient. Cold Symptoms    This is a new problem. The current episode started more than 1 week ago. The problem has not changed since onset. There has been no fever. Associated symptoms include congestion, headaches and cough. Pertinent negatives include no chest pain, no abdominal pain and no diarrhea. She has tried nothing for the symptoms. Past Medical History:   Diagnosis Date    Abnormal Pap smear 08    Abnormal Pap smear of cervix     saw OBGYN    Allergy, unspecified not elsewhere classified     Childhood asthma childhood    may need alb with URI    DUB (dysfunctional uterine bleeding) , recurred ~     Dyslipidemia 2015    Family history of melanoma     Hand pain 2013    Bilaterally. 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  Hypothyroid     Iron deficiency anemia 2013    Mononucleosis elementary, middle school, high school    PCOS (polycystic ovarian syndrome) 2017    Vertigo 2017    Prn meclizine.  Trigger sinus infection    Vitamin D deficiency 2015        Past Surgical History:   Procedure Laterality Date    HX  SECTION      HX TYMPANOSTOMY Bilateral infant    INSERT PICC LINE Left     d/t abscess after          Family History   Problem Relation Age of Onset    Hypertension Father     Stroke Father      TIA    Cancer Father      spinal    High Cholesterol Mother     Other Mother      prediabetes    Heart Disease Mother      irregular heartbeat    Endometriosis Mother     Cancer Maternal Grandmother      lung     Cancer Paternal Grandmother      lymphoma/ brain     Diabetes Paternal Grandmother     Heart Disease Paternal Grandmother     Heart Disease Maternal Grandfather     Cancer Maternal Grandfather      skin- melanoma    Hypertension Brother     Depression Brother     No Known Problems Son Social History     Social History    Marital status:      Spouse name: Madai Willingham    Number of children: 2    Years of education: N/A     Occupational History     for [employer] co with pt with intellectual disability      Social History Main Topics    Smoking status: Former Smoker     Packs/day: 0.75     Years: 3.00     Types: Cigarettes     Quit date: 1/1/2005    Smokeless tobacco: Never Used    Alcohol use 0.5 oz/week     1 Glasses of wine per week      Comment: rare    Drug use: No    Sexual activity: Yes     Partners: Male     Birth control/ protection: None      Comment: monogamous with  since early 80s     Other Topics Concern    Not on file     Social History Narrative    Lives with , son, step son Maria Luisa Huitron)                ALLERGIES: Morphine    Review of Systems   Constitutional: Negative for chills. HENT: Positive for congestion. Respiratory: Positive for cough. Cardiovascular: Negative for chest pain. Gastrointestinal: Negative for abdominal pain and diarrhea. Neurological: Positive for headaches. Vitals:    10/23/17 0835   BP: 141/77   Pulse: 96   Resp: 18   Temp: 98.1 °F (36.7 °C)   SpO2: 100%   Weight: 129.7 kg (286 lb)   Height: 5' 7\" (1.702 m)       Physical Exam   Constitutional: She is oriented to person, place, and time. She appears well-developed and well-nourished. HENT:   Right Ear: External ear normal.   Left Ear: External ear normal.   Eyes: Conjunctivae and EOM are normal.   Cardiovascular: Normal rate and regular rhythm. Pulmonary/Chest: Effort normal and breath sounds normal.   Neurological: She is alert and oriented to person, place, and time. Skin: Skin is warm and dry. Psychiatric: She has a normal mood and affect. Her behavior is normal. Judgment and thought content normal.   Nursing note and vitals reviewed.       MDM     Differential Diagnosis; Clinical Impression; Plan:     CLINICAL IMPRESSION:  Acute upper respiratory infection  (primary encounter diagnosis)  Cough    Plan:  1. Prednisone   2. Robitussin AC  3. Risk of Significant Complications, Morbidity, and/or Mortality:   Presenting problems: Moderate  Diagnostic procedures: Moderate  Management options:   Moderate  Progress:   Patient progress:  Stable      Procedures

## 2017-10-23 NOTE — DISCHARGE INSTRUCTIONS

## 2017-10-24 ENCOUNTER — PATIENT OUTREACH (OUTPATIENT)
Dept: OTHER | Age: 32
End: 2017-10-24

## 2017-10-24 NOTE — PROGRESS NOTES
Transition Of Care Note    Patient discharged from [facility] on 10/23 for URI. 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  Hypothyroid     Iron deficiency anemia 2013    Mononucleosis elementary, middle school, high school    PCOS (polycystic ovarian syndrome) 2017    Vertigo 2017    Prn meclizine. Trigger sinus infection    Vitamin D deficiency 2015       Care Manager contacted the patient by telephone to perform post UC discharge assessment. Verified  and zip code with patient as identifiers. Provided introduction to self, and explanation of the Nurse Care Manager role. Medication:   Performed medication reconciliation with patient, and patient verbalizes understanding of administration of home medications. There were no barriers to obtaining medications identified at this time. Support system:  patient and     Discharge Instructions :  Reviewed discharge instructions with patient. Patient verbalizes understanding of discharge instructions and follow-up care. Red Flags: You seem to be getting much sicker. ·You have new or worse trouble breathing. ·You have a new or higher fever. ·You have a new rash. Advance Care Planning:   Patient was offered the opportunity to discuss advance care planning:  no     Does patient have an Advance Directive:  no   If no, did you provide information on [employer]ctions?  no     PCP/Specialist follow up: Patient scheduled to follow up with Rocio Lay.  Shubham Nails MD in three weeks, states she will continue medications and if not getting better in the next few days, will call and make an appointment. Patient has hx of wheezing and vertigo with URI, states she does have inhaler for wheezing and is effective, does not have vertigo, but has ringing in ears, denies acetaminophen use. Reviewed red flags with patient, and patient verbalizes understanding. Patient given an opportunity to ask questions. No other clinical/social/functional needs noted. The patient agrees to contact the PCP office for questions related to their healthcare. The patient expressed thanks, offered no additional questions and ended the call.

## 2017-11-07 ENCOUNTER — PATIENT OUTREACH (OUTPATIENT)
Dept: OTHER | Age: 32
End: 2017-11-07

## 2017-11-20 ENCOUNTER — OFFICE VISIT (OUTPATIENT)
Dept: FAMILY MEDICINE CLINIC | Age: 32
End: 2017-11-20

## 2017-11-20 VITALS
SYSTOLIC BLOOD PRESSURE: 127 MMHG | TEMPERATURE: 97.2 F | HEART RATE: 91 BPM | DIASTOLIC BLOOD PRESSURE: 76 MMHG | WEIGHT: 286.7 LBS | OXYGEN SATURATION: 97 % | BODY MASS INDEX: 45 KG/M2 | RESPIRATION RATE: 16 BRPM | HEIGHT: 67 IN

## 2017-11-20 DIAGNOSIS — D50.9 IRON DEFICIENCY ANEMIA, UNSPECIFIED IRON DEFICIENCY ANEMIA TYPE: ICD-10-CM

## 2017-11-20 DIAGNOSIS — E28.2 PCOS (POLYCYSTIC OVARIAN SYNDROME): ICD-10-CM

## 2017-11-20 DIAGNOSIS — N92.1 MENORRHAGIA WITH IRREGULAR CYCLE: Primary | ICD-10-CM

## 2017-11-20 RX ORDER — FLUTICASONE PROPIONATE 50 MCG
2 SPRAY, SUSPENSION (ML) NASAL DAILY
COMMUNITY

## 2017-11-20 RX ORDER — METFORMIN HYDROCHLORIDE 500 MG/1
TABLET ORAL
Qty: 42 TAB | Refills: 0 | Status: SHIPPED | OUTPATIENT
Start: 2017-11-20 | End: 2017-12-18 | Stop reason: SDUPTHER

## 2017-11-20 NOTE — PATIENT INSTRUCTIONS
Iron Deficiency Anemia: Care Instructions  Your Care Instructions    Anemia means that you do not have enough red blood cells. Red blood cells carry oxygen around your body. When you have anemia, it can make you pale, weak, and tired. Many things can cause anemia. The most common cause is loss of blood. This can happen if you have heavy menstrual periods. It can also happen if you have bleeding in your stomach or bowel. You can also get anemia if you don't have enough iron in your diet or if it's hard for your body to absorb iron. In some cases, pregnancy causes anemia. That's because a pregnant woman needs more iron. Your doctor may do more tests to find the cause of your anemia. If a disease or other health problem is causing it, your doctor will treat that problem. It's important to follow up with your doctor to make sure that your iron level returns to normal.  Follow-up care is a key part of your treatment and safety. Be sure to make and go to all appointments, and call your doctor if you are having problems. It's also a good idea to know your test results and keep a list of the medicines you take. How can you care for yourself at home? · If your doctor recommended iron pills, take them as directed. ¨ Try to take the pills on an empty stomach. You can do this about 1 hour before or 2 hours after meals. But you may need to take iron with food to avoid an upset stomach. ¨ Do not take antacids or drink milk or anything with caffeine within 2 hours of when you take your iron. They can keep your body from absorbing the iron well. ¨ Vitamin C helps your body absorb iron. You may want to take iron pills with a glass of orange juice or some other food high in vitamin C.  ¨ Iron pills may cause stomach problems. These include heartburn, nausea, diarrhea, constipation, and cramps. It can help to drink plenty of fluids and include fruits, vegetables, and fiber in your diet.   ¨ It's normal for iron pills to make your stool a greenish or grayish black. But internal bleeding can also cause dark stool. So it's important to tell your doctor about any color changes. ¨ Call your doctor if you think you are having a problem with your iron pills. Even after you start to feel better, it will take several months for your body to build up its supply of iron. ¨ If you miss a pill, don't take a double dose. ¨ Keep iron pills out of the reach of small children. Too much iron can be very dangerous. · Eat foods with a lot of iron. These include red meat, shellfish, poultry, and eggs. They also include beans, raisins, whole-grain bread, and leafy green vegetables. · Steam your vegetables. This is the best way to prepare them if you want to get as much iron as possible. · Be safe with medicines. Do not take nonsteroidal anti-inflammatory pain relievers unless your doctor tells you to. These include aspirin, naproxen (Aleve), and ibuprofen (Advil, Motrin). · Liquid iron can stain your teeth. But you can mix it with water or juice and drink it with a straw. Then it won't get on your teeth. When should you call for help? Call 911 anytime you think you may need emergency care. For example, call if:  ? · You passed out (lost consciousness). ?Call your doctor now or seek immediate medical care if:  ? · You are short of breath. ? · You are dizzy or light-headed, or you feel like you may faint. ? · You have new or worse bleeding. ? Watch closely for changes in your health, and be sure to contact your doctor if:  ? · You feel weaker or more tired than usual.   ? · You do not get better as expected. Where can you learn more? Go to http://amarjit-hipolito.info/. Enter I197 in the search box to learn more about \"Iron Deficiency Anemia: Care Instructions. \"  Current as of: October 13, 2016  Content Version: 11.4  © 1833-7206 [employer], [employer].  Care instructions adapted under license by [employer] (which disclaims liability or warranty for this information). If you have questions about a medical condition or this instruction, always ask your healthcare professional. [address] any warranty or liability for your use of this information. Polycystic Ovary Syndrome: Care Instructions  Your Care Instructions  Polycystic ovary syndrome, or PCOS, means a woman's hormones are out of balance. It can cause problems with your periods and make it hard to get pregnant. Doctors don't know for sure what causes PCOS, but it seems to run in families. It also seems to be linked to obesity and a risk for diabetes. If you have PCOS, your sisters and daughters have a higher chance of getting it too. You may have other symptoms. These include weight gain, acne, too much hair growth on the face or body, high blood pressure, and high blood sugar. Your ovaries may have cysts on them. These cysts are growths filled with fluid. Keep in mind that although you may not have regular periods, you can still get pregnant. Talk to your doctor about birth control if you do not want to get pregnant. Sometimes the hormone changes with PCOS can also make it hard for some women to get pregnant. If this is a concern, talk to your doctor about treatment for this problem. Women who have PCOS can go for months or longer with no period. Your doctor may recommend medicines that can help get your cycles back to normal.  Follow-up care is a key part of your treatment and safety. Be sure to make and go to all appointments, and call your doctor if you are having problems. It's also a good idea to know your test results and keep a list of the medicines you take. How can you care for yourself at home? · Take your medicines exactly as prescribed. Call your doctor if you think you are having a problem with your medicine. · Eat a healthy diet. Include fruits, vegetables, beans, and whole grains in your diet each day.   · If you are overweight, losing weight can help with many of the symptoms of PCOS. Talk to your doctor about safe ways to lose weight. · Get at least 30 minutes of exercise on most days of the week. Walking is a good choice. Or you can run, swim, cycle, or play tennis or team sports. · For hair growth you don't want, try bleaching, plucking, electrolysis, or laser therapy. · Acne can be treated with over-the-counter medicines. Look for ones that have benzoyl peroxide or salicylic acid in them. When should you call for help? Call your doctor now or seek immediate medical care if:  ? · You have severe vaginal bleeding. ? · You have new or worse belly or pelvic pain. ? Watch closely for changes in your health, and be sure to contact your doctor if:  ? · You do not get better as expected. ? · You have unusual vaginal bleeding. Where can you learn more? Go to http://amarjit-hipolito.info/. Enter Y796 in the search box to learn more about \"Polycystic Ovary Syndrome: Care Instructions. \"  Current as of: October 13, 2016  Content Version: 11.4  © 4033-8088 [employer]. Care instructions adapted under license by [employer] (which disclaims liability or warranty for this information). If you have questions about a medical condition or this instruction, always ask your healthcare professional. [address] any warranty or liability for your use of this information.

## 2017-11-20 NOTE — PROGRESS NOTES
Reviewed record in preparation for visit and have obtained necessary documentation. Identified pt with two pt identifiers(name and ). Chief Complaint   Patient presents with    Vaginal Bleeding     Excessive Bleeding with Clots, Started Saturday Morning    Dizziness     Lightheadedness    Leg Pain     Thigh Muscle Weakness       Vitals:    17 1434   BP: 127/76   Pulse: 91   Resp: 16   Temp: 97.2 °F (36.2 °C)   TempSrc: Oral   SpO2: 97%   Weight: 286 lb 11.2 oz (130 kg)   Height: 5' 7\" (1.702 m)   PainSc:   3   PainLoc: Abdomen   LMP: 2017       Coordination of Care Questionnaire:  :     1) Have you been to an emergency room, urgent care clinic since your last visit? no   Hospitalized since your last visit? no             2) Have you seen or consulted any other health care providers outside of [address] [place] [address] since your last visit? no  (Include any pap smears or colon screenings in this section.)    3) In the event something were to happen to you and you were unable to speak on your behalf, do you have an Advance Directive/ Living Will in place stating your wishes? NO    Do you have an Advance Directive on file? no    4) Are you interested in receiving information on Advance Directives?  NO

## 2017-11-20 NOTE — MR AVS SNAPSHOT
Visit Information Date & Time Provider Department Dept. Phone Encounter #  
 11/20/2017  2:20 PM Nicole Aguayo NP [facility] [phone] [id number] Follow-up Instructions Return in about 4 weeks (around 12/18/2017) for Medication Check, PCOS. Your Appointments 12/5/2017  5:00 PM  
ROUTINE CARE with Nonda Fearing. Jose Cancer, [address] ([facility]) Appt Note: 6wk weight f/u; 6wk weight f/u/Resched from 11/17/17; Josem Ganser [address] [facility] [address]  
[phone]  
  
   
 [address] [facility] [address] [facility] Upcoming Health Maintenance Date Due Influenza Age 5 to Adult 11/30/2017* PAP AKA CERVICAL CYTOLOGY 5/26/2019 DTaP/Tdap/Td series (2 - Td) 5/26/2026 *Topic was postponed. The date shown is not the original due date. Allergies as of 11/20/2017  Review Complete On: 11/20/2017 By: Nicole Aguayo NP Severity Noted Reaction Type Reactions Morphine  02/26/2013    Other (comments) Tremors Current Immunizations  Reviewed on 12/2/2014 Name Date Influenza Vaccine 10/8/2014 Not reviewed this visit You Were Diagnosed With   
  
 Codes Comments Menorrhagia with irregular cycle    -  Primary ICD-10-CM: N92.1 ICD-9-CM: 626.2 PCOS (polycystic ovarian syndrome)     ICD-10-CM: E28.2 ICD-9-CM: 256.4 Iron deficiency anemia, unspecified iron deficiency anemia type     ICD-10-CM: D50.9 ICD-9-CM: 280.9 Vitals BP Pulse Temp Resp Height(growth percentile) Weight(growth percentile) 127/76 (BP 1 Location: Left arm, BP Patient Position: Sitting) 91 97.2 °F (36.2 °C) (Oral) 16 5' 7\" (1.702 m) 286 lb 11.2 oz (130 kg) LMP SpO2 BMI OB Status Smoking Status 11/18/2017 97% 44.9 kg/m2 Unknown Former Smoker BMI and BSA Data Body Mass Index Body Surface Area 44.9 kg/m 2 2.48 m 2 Preferred Pharmacy Pharmacy Name Phone [facility], [facility] [address] [place] [address] [phone] Your Updated Medication List  
  
   
This list is accurate as of: 11/20/17  3:35 PM.  Always use your most recent med list.  
  
  
  
  
 albuterol 90 mcg/actuation inhaler Commonly known as:  PROVENTIL HFA, VENTOLIN HFA, PROAIR HFA Take 2 Puffs by inhalation every four (4) hours as needed for Wheezing or Shortness of Breath (cough). FLONASE 50 mcg/actuation nasal spray Generic drug:  fluticasone 2 Sprays by Both Nostrils route daily. loratadine 10 mg Cap Take  by mouth.  
  
 metFORMIN 500 mg tablet Commonly known as:  GLUCOPHAGE Take 1 tab with breakfast daily x 2 weeks then increase to 1 tab with breakfast and 1 tab with dinner x 2 weeks. Indications: Polycystic Ovarian Syndrome VITAMIN C 250 mg tablet Generic drug:  ascorbic acid (vitamin C) Take  by mouth. VITAMIN D3 COMPLETE PO Take  by mouth. Prescriptions Printed Refills  
 metFORMIN (GLUCOPHAGE) 500 mg tablet 0 Sig: Take 1 tab with breakfast daily x 2 weeks then increase to 1 tab with breakfast and 1 tab with dinner x 2 weeks. Indications: Polycystic Ovarian Syndrome Class: Print We Performed the Following CBC WITH AUTOMATED DIFF [69235 CPT(R)] IRON PROFILE [id number] CPT(R)] METABOLIC PANEL, COMPREHENSIVE [47532 CPT(R)] Follow-up Instructions Return in about 4 weeks (around 12/18/2017) for Medication Check, PCOS. Patient Instructions Iron Deficiency Anemia: Care Instructions Your Care Instructions Anemia means that you do not have enough red blood cells. Red blood cells carry oxygen around your body. When you have anemia, it can make you pale, weak, and tired. Many things can cause anemia. The most common cause is loss of blood. This can happen if you have heavy menstrual periods.  It can also happen if you have bleeding in your stomach or bowel. You can also get anemia if you don't have enough iron in your diet or if it's hard for your body to absorb iron. In some cases, pregnancy causes anemia. That's because a pregnant woman needs more iron. Your doctor may do more tests to find the cause of your anemia. If a disease or other health problem is causing it, your doctor will treat that problem. It's important to follow up with your doctor to make sure that your iron level returns to normal. 
Follow-up care is a key part of your treatment and safety. Be sure to make and go to all appointments, and call your doctor if you are having problems. It's also a good idea to know your test results and keep a list of the medicines you take. How can you care for yourself at home? · If your doctor recommended iron pills, take them as directed. ¨ Try to take the pills on an empty stomach. You can do this about 1 hour before or 2 hours after meals. But you may need to take iron with food to avoid an upset stomach. ¨ Do not take antacids or drink milk or anything with caffeine within 2 hours of when you take your iron. They can keep your body from absorbing the iron well. ¨ Vitamin C helps your body absorb iron. You may want to take iron pills with a glass of orange juice or some other food high in vitamin C. 
¨ Iron pills may cause stomach problems. These include heartburn, nausea, diarrhea, constipation, and cramps. It can help to drink plenty of fluids and include fruits, vegetables, and fiber in your diet. ¨ It's normal for iron pills to make your stool a greenish or grayish black. But internal bleeding can also cause dark stool. So it's important to tell your doctor about any color changes. ¨ Call your doctor if you think you are having a problem with your iron pills. Even after you start to feel better, it will take several months for your body to build up its supply of iron. ¨ If you miss a pill, don't take a double dose. ¨ Keep iron pills out of the reach of small children. Too much iron can be very dangerous. · Eat foods with a lot of iron. These include red meat, shellfish, poultry, and eggs. They also include beans, raisins, whole-grain bread, and leafy green vegetables. · Steam your vegetables. This is the best way to prepare them if you want to get as much iron as possible. · Be safe with medicines. Do not take nonsteroidal anti-inflammatory pain relievers unless your doctor tells you to. These include aspirin, naproxen (Aleve), and ibuprofen (Advil, Motrin). · Liquid iron can stain your teeth. But you can mix it with water or juice and drink it with a straw. Then it won't get on your teeth. When should you call for help? Call 911 anytime you think you may need emergency care. For example, call if: 
? · You passed out (lost consciousness). ?Call your doctor now or seek immediate medical care if: 
? · You are short of breath. ? · You are dizzy or light-headed, or you feel like you may faint. ? · You have new or worse bleeding. ? Watch closely for changes in your health, and be sure to contact your doctor if: 
? · You feel weaker or more tired than usual.  
? · You do not get better as expected. Where can you learn more? Go to http://amarjit-hipolito.info/. Enter V209 in the search box to learn more about \"Iron Deficiency Anemia: Care Instructions. \" Current as of: October 13, 2016 Content Version: 11.4 © 1567-7958 [employer]. Care instructions adapted under license by [employer] (which disclaims liability or warranty for this information). If you have questions about a medical condition or this instruction, always ask your healthcare professional. [address] any warranty or liability for your use of this information. Polycystic Ovary Syndrome: Care Instructions Your Care Instructions Polycystic ovary syndrome, or PCOS, means a woman's hormones are out of balance. It can cause problems with your periods and make it hard to get pregnant. Doctors don't know for sure what causes PCOS, but it seems to run in families. It also seems to be linked to obesity and a risk for diabetes. If you have PCOS, your sisters and daughters have a higher chance of getting it too. You may have other symptoms. These include weight gain, acne, too much hair growth on the face or body, high blood pressure, and high blood sugar. Your ovaries may have cysts on them. These cysts are growths filled with fluid. Keep in mind that although you may not have regular periods, you can still get pregnant. Talk to your doctor about birth control if you do not want to get pregnant. Sometimes the hormone changes with PCOS can also make it hard for some women to get pregnant. If this is a concern, talk to your doctor about treatment for this problem. Women who have PCOS can go for months or longer with no period. Your doctor may recommend medicines that can help get your cycles back to normal. 
Follow-up care is a key part of your treatment and safety. Be sure to make and go to all appointments, and call your doctor if you are having problems. It's also a good idea to know your test results and keep a list of the medicines you take. How can you care for yourself at home? · Take your medicines exactly as prescribed. Call your doctor if you think you are having a problem with your medicine. · Eat a healthy diet. Include fruits, vegetables, beans, and whole grains in your diet each day. · If you are overweight, losing weight can help with many of the symptoms of PCOS. Talk to your doctor about safe ways to lose weight. · Get at least 30 minutes of exercise on most days of the week. Walking is a good choice. Or you can run, swim, cycle, or play tennis or team sports. · For hair growth you don't want, try bleaching, plucking, electrolysis, or laser therapy. · Acne can be treated with over-the-counter medicines. Look for ones that have benzoyl peroxide or salicylic acid in them. When should you call for help? Call your doctor now or seek immediate medical care if: 
? · You have severe vaginal bleeding. ? · You have new or worse belly or pelvic pain. ? Watch closely for changes in your health, and be sure to contact your doctor if: 
? · You do not get better as expected. ? · You have unusual vaginal bleeding. Where can you learn more? Go to http://amarjit-hipolito.info/. Enter K007 in the search box to learn more about \"Polycystic Ovary Syndrome: Care Instructions. \" Current as of: October 13, 2016 Content Version: 11.4 © 3499-2823 [employer]. Care instructions adapted under license by [employer] (which disclaims liability or warranty for this information). If you have questions about a medical condition or this instruction, always ask your healthcare professional. [address] any warranty or liability for your use of this information. Introducing [facility]! Dear Sully Palacios: Thank you for requesting a [employer] account. Our records indicate that you already have an active [employer] account. You can access your account anytime at [email or web address] Did you know that you can access your hospital and ER discharge instructions at any time in [employer]? You can also review all of your test results from your hospital stay or ER visit. Additional Information If you have questions, please visit the Frequently Asked Questions section of the [employer] website at [email or web address] Remember, [employer] is NOT to be used for urgent needs. For medical emergencies, dial 911. Now available from your iPhone and Android! Please provide this summary of care documentation to your next provider. Your primary care clinician is listed as Elizabeth Peters. If you have any questions after today's visit, please call [phone].

## 2017-11-20 NOTE — PROGRESS NOTES
Chief Complaint   Patient presents with    Vaginal Bleeding     Excessive Bleeding with Clots, Started Saturday Morning    Dizziness     Lightheadedness    Leg Pain     Thigh Muscle Weakness         HPI:  The patient is a 34 y.o. female who complains of irregular menses. She had been bleeding irregularly, infrequently. She is now bleeding every 2 months and menses are lasting up to 5 days. Her current period started 3 days ago and she states this is the heaviest period she has ever had. Pad or tampon count: changes every 1-2 hours with super plus tampons and pad for extra protection. May experience clots of size up to 1-2 cm. Dysmenorrhea: moderate, occurring throughout menses. Cyclic symptoms include breast tenderness and irritability. She denies pelvic pain  History of infertility: yes - trying to get pregnant for the past 2 years. Has 2 children, 1 child is biological child and the other is not. Sexual history: single partner, contraception - none. Prior Pap smear:was normal.    A comprehensive review of systems was negative except for:  Constitutional: positive for fatigue  Respiratory: positive for negative  Cardiovascular: positive for none  Gastrointestinal: positive for constipation  Genitourinary: positive for abnormal menstrual periods  Integument/breast: positive for breast tenderness  Neurological: positive for headaches and dizziness  Behvioral/Psych: positive for mood swings    No hospital, ER or specialist visits since last primary care visit except as noted below.      Patient Active Problem List   Diagnosis Code    Allergic rhinitis J30.9    History of hypothyroidism Z86.39    Iron deficiency anemia D50.9    Obesity, Class III, BMI 40-49.9 (morbid obesity) ([facility] E66.01    Dyslipidemia E78.5    Vitamin D deficiency E55.9    Family history of melanoma Z80.8    Childhood asthma J45.909    Abnormal Pap smear of cervix R87.619    Vertigo R42    PCOS (polycystic ovarian syndrome) E28.2       Past Medical History:   Diagnosis Date    Abnormal Pap smear 08    Abnormal Pap smear of cervix     saw OBGYN    Allergy, unspecified not elsewhere classified     Childhood asthma childhood    may need alb with URI    DUB (dysfunctional uterine bleeding) , recurred ~     Dyslipidemia 2015    Family history of melanoma     Hand pain 2013    Bilaterally. 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  Hypothyroid     Iron deficiency anemia 2013    Mononucleosis elementary, middle school, high school    PCOS (polycystic ovarian syndrome) 2017    PCOS (polycystic ovarian syndrome) 2017    Vertigo 2017    Prn meclizine.  Trigger sinus infection    Vitamin D deficiency 2015       Past Surgical History:   Procedure Laterality Date    HX  SECTION  2009    HX TYMPANOSTOMY Bilateral infant    INSERT PICC LINE Left     d/t abscess after        Social History   Substance Use Topics    Smoking status: Former Smoker     Packs/day: 0.75     Years: 3.00     Types: Cigarettes     Quit date: 2005    Smokeless tobacco: Never Used    Alcohol use 0.5 oz/week     1 Glasses of wine per week      Comment: rare       Family History   Problem Relation Age of Onset    Hypertension Father     Stroke Father      TIA    Cancer Father      spinal    High Cholesterol Mother     Other Mother      prediabetes    Heart Disease Mother      irregular heartbeat    Endometriosis Mother     Cancer Maternal Grandmother      lung     Cancer Paternal Grandmother      lymphoma/ brain     Diabetes Paternal Grandmother     Heart Disease Paternal Grandmother     Heart Disease Maternal Grandfather     Cancer Maternal Grandfather      skin- melanoma    Hypertension Brother     Depression Brother     No Known Problems Son        Outpatient Prescriptions Marked as Taking for the 17 encounter (Office Visit) with Destiny Ibarra NP Medication Sig Dispense Refill    fluticasone (FLONASE) 50 mcg/actuation nasal spray 2 Sprays by Both Nostrils route daily.  loratadine 10 mg cap Take  by mouth.  albuterol (PROVENTIL HFA, VENTOLIN HFA, PROAIR HFA) 90 mcg/actuation inhaler Take 2 Puffs by inhalation every four (4) hours as needed for Wheezing or Shortness of Breath (cough). 1 Inhaler 0    MV-MN/IRON/FOLIC ACID/HERB 485 (VITAMIN D3 COMPLETE PO) Take  by mouth.  ascorbic acid, vitamin C, (VITAMIN C) 250 mg tablet Take  by mouth. Allergies   Allergen Reactions    Morphine Other (comments)     Tremors       ROS:  Gen: no fatigue, no fever, no chills, no unexplained weight loss or weight gain  Eyes: no excessive tearing, itching, or discharge  Nose: no rhinorrhea, no sinus pain  Mouth: no oral lesions, no sore throat, no difficulty swallowing  Resp: no shortness of breath, no wheezing, no cough  CV: no chest pain, no orthopnea, no paroxysmal nocturnal dyspnea, no lower extremity edema, no palpitations  Abd: no nausea, no heartburn, no diarrhea, no constipation, no abdominal pain  Neuro: no headaches, no syncope or presyncopal episodes  Endo: no polyuria, no polydipsia.     : no hematuria, no dysuria, no frequency, no incontinence  Heme: no lymphadenopathy, no easy bruising or bleeding, no night sweats  MSK: no joint pain or swelling    PE:  Visit Vitals    /76 (BP 1 Location: Left arm, BP Patient Position: Sitting)    Pulse 91    Temp 97.2 °F (36.2 °C) (Oral)    Resp 16    Ht 5' 7\" (1.702 m)    Wt 286 lb 11.2 oz (130 kg)    LMP 11/18/2017    SpO2 97%    BMI 44.9 kg/m2     Gen: alert, oriented, no acute distress  Head: normocephalic, atraumatic  Ears: external auditory canals clear, TMs without erythema or effusion  Eyes: pupils equal round reactive to light, sclera clear, conjunctiva clear  Oral: moist mucus membranes, no oral lesions, no pharyngeal inflammation or exudate  Neck: symmetric normal sized thyroid, no carotid bruits, no jugular vein distention  Resp: no increase work of breathing, lungs clear to ausculation bilaterally, no wheezing, rales or rhonchi  CV: S1, S2 normal.  No murmurs, rubs, or gallops. Abd: soft, not tender, not distended. No hepatosplenomegaly. Normal bowel sounds. No hernias. No abdominal or renal bruits. Neuro: cranial nerves intact, normal strength and movement in all extremities, reflexes and sensation intact and symmetric. Skin: no lesion or rash  Extremities: no cyanosis or edema    No results found for this visit on 11/20/17. Assessment/Plan:    ICD-10-CM ICD-9-CM    1. Menorrhagia with irregular cycle N92.1 626.2 CBC WITH AUTOMATED DIFF      METABOLIC PANEL, COMPREHENSIVE      IRON PROFILE   2. PCOS (polycystic ovarian syndrome) E28.2 256.4 CBC WITH AUTOMATED DIFF      METABOLIC PANEL, COMPREHENSIVE      metFORMIN (GLUCOPHAGE) 500 mg tablet   3. Iron deficiency anemia, unspecified iron deficiency anemia type D50.9 280.9 CBC WITH AUTOMATED DIFF      METABOLIC PANEL, COMPREHENSIVE      IRON PROFILE     Follow-up Disposition:  Return in about 4 weeks (around 12/18/2017) for Medication Check, PCOS. lab results and schedule of future lab studies reviewed with patient  reviewed diet, exercise and weight control  reviewed medications and side effects in detail  radiology results and schedule of future radiology studies reviewed with patient    Health Maintenance reviewed - reviewed, UTD. Recommended healthy diet low in carbohydrates, fats, sodium and cholesterol. Recommended regular cardiovascular exercise 3-6 times per week for 30-60 minutes daily. Verbal and written instructions (see AVS) provided. Patient expresses understanding of diagnosis and treatment plan.         Subjective:         Objective:     Visit Vitals    /76 (BP 1 Location: Left arm, BP Patient Position: Sitting)    Pulse 91    Temp 97.2 °F (36.2 °C) (Oral)    Resp 16    Ht 5' 7\" (1.702 m)    Wt 286 lb 11.2 oz (130 kg)    LMP 11/18/2017    SpO2 97%    BMI 44.9 kg/m2

## 2017-11-21 LAB
ALBUMIN SERPL-MCNC: 4.3 G/DL (ref 3.5–5.5)
ALBUMIN/GLOB SERPL: 1.4 {RATIO} (ref 1.2–2.2)
ALP SERPL-CCNC: 89 IU/L (ref 39–117)
ALT SERPL-CCNC: 13 IU/L (ref 0–32)
AST SERPL-CCNC: 11 IU/L (ref 0–40)
BASOPHILS # BLD AUTO: 0 X10E3/UL (ref 0–0.2)
BASOPHILS NFR BLD AUTO: 0 %
BILIRUB SERPL-MCNC: <0.2 MG/DL (ref 0–1.2)
BUN SERPL-MCNC: 12 MG/DL (ref 6–20)
BUN/CREAT SERPL: 17 (ref 9–23)
CALCIUM SERPL-MCNC: 9.8 MG/DL (ref 8.7–10.2)
CHLORIDE SERPL-SCNC: 99 MMOL/L (ref 96–106)
CO2 SERPL-SCNC: 27 MMOL/L (ref 18–29)
CREAT SERPL-MCNC: 0.71 MG/DL (ref 0.57–1)
EOSINOPHIL # BLD AUTO: 0.2 X10E3/UL (ref 0–0.4)
EOSINOPHIL NFR BLD AUTO: 2 %
ERYTHROCYTE [DISTWIDTH] IN BLOOD BY AUTOMATED COUNT: 14.3 % (ref 12.3–15.4)
GFR SERPLBLD CREATININE-BSD FMLA CKD-EPI: 112 ML/MIN/1.73
GFR SERPLBLD CREATININE-BSD FMLA CKD-EPI: 129 ML/MIN/1.73
GLOBULIN SER CALC-MCNC: 3 G/DL (ref 1.5–4.5)
GLUCOSE SERPL-MCNC: 104 MG/DL (ref 65–99)
HCT VFR BLD AUTO: 38 % (ref 34–46.6)
HGB BLD-MCNC: 12.8 G/DL (ref 11.1–15.9)
IMM GRANULOCYTES # BLD: 0 X10E3/UL (ref 0–0.1)
IMM GRANULOCYTES NFR BLD: 0 %
IRON SATN MFR SERPL: 10 % (ref 15–55)
IRON SERPL-MCNC: 30 UG/DL (ref 27–159)
LYMPHOCYTES # BLD AUTO: 1.8 X10E3/UL (ref 0.7–3.1)
LYMPHOCYTES NFR BLD AUTO: 24 %
MCH RBC QN AUTO: 28.4 PG (ref 26.6–33)
MCHC RBC AUTO-ENTMCNC: 33.7 G/DL (ref 31.5–35.7)
MCV RBC AUTO: 84 FL (ref 79–97)
MONOCYTES # BLD AUTO: 0.5 X10E3/UL (ref 0.1–0.9)
MONOCYTES NFR BLD AUTO: 7 %
NEUTROPHILS # BLD AUTO: 5 X10E3/UL (ref 1.4–7)
NEUTROPHILS NFR BLD AUTO: 67 %
PLATELET # BLD AUTO: 239 X10E3/UL (ref 150–379)
POTASSIUM SERPL-SCNC: 4.2 MMOL/L (ref 3.5–5.2)
PROT SERPL-MCNC: 7.3 G/DL (ref 6–8.5)
RBC # BLD AUTO: 4.5 X10E6/UL (ref 3.77–5.28)
SODIUM SERPL-SCNC: 141 MMOL/L (ref 134–144)
TIBC SERPL-MCNC: 298 UG/DL (ref 250–450)
UIBC SERPL-MCNC: 268 UG/DL (ref 131–425)
WBC # BLD AUTO: 7.6 X10E3/UL (ref 3.4–10.8)

## 2017-11-21 NOTE — PROGRESS NOTES
Spoke with pt about her lab result and informed her per Hay Villarreal NP that all labs are normal. Pt did not have any further questions and pt verbalized understanding.

## 2017-12-15 ENCOUNTER — PATIENT OUTREACH (OUTPATIENT)
Dept: OTHER | Age: 32
End: 2017-12-15

## 2017-12-15 NOTE — PROGRESS NOTES
Resolving current episode Transitions of care complete. No further ED/UC or hospital admissions within 30 days post discharge. Patient attended follow-up appointments as directed. No outreach from patient to [address] [place] [address].

## 2017-12-18 ENCOUNTER — OFFICE VISIT (OUTPATIENT)
Dept: FAMILY MEDICINE CLINIC | Age: 32
End: 2017-12-18

## 2017-12-18 VITALS
WEIGHT: 286 LBS | TEMPERATURE: 97.6 F | DIASTOLIC BLOOD PRESSURE: 77 MMHG | OXYGEN SATURATION: 96 % | RESPIRATION RATE: 18 BRPM | HEART RATE: 90 BPM | HEIGHT: 67 IN | BODY MASS INDEX: 44.89 KG/M2 | SYSTOLIC BLOOD PRESSURE: 125 MMHG

## 2017-12-18 DIAGNOSIS — N93.8 DUB (DYSFUNCTIONAL UTERINE BLEEDING): ICD-10-CM

## 2017-12-18 DIAGNOSIS — E28.2 PCOS (POLYCYSTIC OVARIAN SYNDROME): ICD-10-CM

## 2017-12-18 DIAGNOSIS — R30.0 DYSURIA: Primary | ICD-10-CM

## 2017-12-18 LAB
BILIRUB UR QL STRIP: NEGATIVE
GLUCOSE UR-MCNC: NEGATIVE MG/DL
KETONES P FAST UR STRIP-MCNC: NEGATIVE MG/DL
PH UR STRIP: 6 [PH] (ref 4.6–8)
PROT UR QL STRIP: NEGATIVE
SP GR UR STRIP: 1 (ref 1–1.03)
UA UROBILINOGEN AMB POC: NORMAL (ref 0.2–1)
URINALYSIS CLARITY POC: CLEAR
URINALYSIS COLOR POC: YELLOW
URINE BLOOD POC: NEGATIVE
URINE LEUKOCYTES POC: NORMAL
URINE NITRITES POC: NEGATIVE

## 2017-12-18 RX ORDER — METFORMIN HYDROCHLORIDE 500 MG/1
500 TABLET ORAL 2 TIMES DAILY WITH MEALS
Qty: 60 TAB | Refills: 2 | Status: SHIPPED | OUTPATIENT
Start: 2017-12-18 | End: 2018-01-17

## 2017-12-18 RX ORDER — SULFAMETHOXAZOLE AND TRIMETHOPRIM 800; 160 MG/1; MG/1
1 TABLET ORAL 2 TIMES DAILY
Qty: 10 TAB | Refills: 0 | Status: SHIPPED | OUTPATIENT
Start: 2017-12-18 | End: 2017-12-23

## 2017-12-18 NOTE — PROGRESS NOTES
Chief Complaint   Patient presents with    Follow-up     6 week weight f/u    Medication Refill    PCOS    Urinary Pain     Concerned that she may have a UTI         HPI:  The patient is a 34 y.o. female who presents today for a follow up appointment. No hospital, ER or specialist visits since last primary care visit except as noted below. Irregular menses  -very heavy last period w/ clots at last visit. Resolved spontaneously. PCOS  -started on Metformin last visit. Has tolreated this well. She is taking both at dinner time without issue. Dysuria  Started approximately one week ago. Started with \"stinging\" with urination. Drank water and cranberry juice with improvement of symptoms. When she went back to coffee and tea, her symptoms worsened. She returned to water and cranberry juice with improvement. Labs  Office Visit on 12/18/2017   Component Date Value Ref Range Status    Urine Culture, Routine 12/19/2017 *  Final                    Value:Escherichia coli  50,000-100,000 colony forming units per mL      Comment: Cefazolin <=4 ug/mL  Cefazolin with an MARA <=16 predicts susceptibility to the oral agents  cefaclor, cefdinir, cefpodoxime, cefprozil, cefuroxime, cephalexin,  and loracarbef when used for therapy of uncomplicated urinary tract  infections due to E. coli, Klebsiella pneumoniae, and Proteus  mirabilis.       Color (UA POC) 12/18/2017 Yellow   Final    Clarity (UA POC) 12/18/2017 Clear   Final    Glucose (UA POC) 12/18/2017 Negative  Negative Final    Bilirubin (UA POC) 12/18/2017 Negative  Negative Final    Ketones (UA POC) 12/18/2017 Negative  Negative Final    Specific gravity (UA POC) 12/18/2017 1.005  1.001 - 1.035 Final    Blood (UA POC) 12/18/2017 Negative  Negative Final    pH (UA POC) 12/18/2017 6.0  4.6 - 8.0 Final    Protein (UA POC) 12/18/2017 Negative  Negative Final    Urobilinogen (UA POC) 12/18/2017 0.2 mg/dL  0.2 - 1 Final    Nitrites (UA POC) 12/18/2017 Negative  Negative Final    Leukocyte esterase (UA POC) 12/18/2017 Trace  Negative Final   Office Visit on 11/20/2017   Component Date Value Ref Range Status    WBC 11/20/2017 7.6  3.4 - 10.8 x10E3/uL Final    RBC 11/20/2017 4.50  3.77 - 5.28 x10E6/uL Final    HGB 11/20/2017 12.8  11.1 - 15.9 g/dL Final    Comment: **Effective December 4, 2017 the reference interval**    for Hemoglobin MALES only will be changing to: Males 13-15 years: 12.6 - 17.7                          Males   >15 years: 13.0 - 17.7      HCT 11/20/2017 38.0  34.0 - 46.6 % Final    MCV 11/20/2017 84  79 - 97 fL Final    MCH 11/20/2017 28.4  26.6 - 33.0 pg Final    MCHC 11/20/2017 33.7  31.5 - 35.7 g/dL Final    RDW 11/20/2017 14.3  12.3 - 15.4 % Final    PLATELET 04/12/4365 648  150 - 379 x10E3/uL Final    NEUTROPHILS 11/20/2017 67  Not Estab. % Final    Lymphocytes 11/20/2017 24  Not Estab. % Final    MONOCYTES 11/20/2017 7  Not Estab. % Final    EOSINOPHILS 11/20/2017 2  Not Estab. % Final    BASOPHILS 11/20/2017 0  Not Estab. % Final    ABS. NEUTROPHILS 11/20/2017 5.0  1.4 - 7.0 x10E3/uL Final    Abs Lymphocytes 11/20/2017 1.8  0.7 - 3.1 x10E3/uL Final    ABS. MONOCYTES 11/20/2017 0.5  0.1 - 0.9 x10E3/uL Final    ABS. EOSINOPHILS 11/20/2017 0.2  0.0 - 0.4 x10E3/uL Final    ABS. BASOPHILS 11/20/2017 0.0  0.0 - 0.2 x10E3/uL Final    IMMATURE GRANULOCYTES 11/20/2017 0  Not Estab. % Final    ABS. IMM.  GRANS. 11/20/2017 0.0  0.0 - 0.1 x10E3/uL Final    Glucose 11/20/2017 104* 65 - 99 mg/dL Final    BUN 11/20/2017 12  6 - 20 mg/dL Final    Creatinine 11/20/2017 0.71  0.57 - 1.00 mg/dL Final    GFR est non-AA 11/20/2017 112  >59 mL/min/1.73 Final    GFR est AA 11/20/2017 129  >59 mL/min/1.73 Final    BUN/Creatinine ratio 11/20/2017 17  9 - 23 Final    Sodium 11/20/2017 141  134 - 144 mmol/L Final    Potassium 11/20/2017 4.2  3.5 - 5.2 mmol/L Final    Chloride 11/20/2017 99  96 - 106 mmol/L Final    CO2 11/20/2017 27  18 - 29 mmol/L Final    Calcium 11/20/2017 9.8  8.7 - 10.2 mg/dL Final    Protein, total 11/20/2017 7.3  6.0 - 8.5 g/dL Final    Albumin 11/20/2017 4.3  3.5 - 5.5 g/dL Final    GLOBULIN, TOTAL 11/20/2017 3.0  1.5 - 4.5 g/dL Final    A-G Ratio 11/20/2017 1.4  1.2 - 2.2 Final    Bilirubin, total 11/20/2017 <0.2  0.0 - 1.2 mg/dL Final    Alk. phosphatase 11/20/2017 89  39 - 117 IU/L Final    AST (SGOT) 11/20/2017 11  0 - 40 IU/L Final    ALT (SGPT) 11/20/2017 13  0 - 32 IU/L Final    TIBC 11/20/2017 298  250 - 450 ug/dL Final    UIBC 11/20/2017 268  131 - 425 ug/dL Final    Iron 11/20/2017 30  27 - 159 ug/dL Final    Iron % saturation 11/20/2017 10* 15 - 55 % Final       Patient Active Problem List   Diagnosis Code    Allergic rhinitis J30.9    History of hypothyroidism Z86.39    Iron deficiency anemia D50.9    Obesity, Class III, BMI 40-49.9 (morbid obesity) (HCC) E66.01    Dyslipidemia E78.5    Vitamin D deficiency E55.9    Family history of melanoma Z80.8    Childhood asthma J45.909    Abnormal Pap smear of cervix R87.619    Vertigo R42    PCOS (polycystic ovarian syndrome) E28.2       Past Medical History:   Diagnosis Date    Abnormal Pap smear 07/07/08    Abnormal Pap smear of cervix 2012    saw OBGYN    Allergy, unspecified not elsewhere classified     Childhood asthma childhood    may need alb with URI    DUB (dysfunctional uterine bleeding) 2012, recurred ~ 2015    Dyslipidemia 4/7/2015    Family history of melanoma     Hand pain 2/26/2013    Bilaterally. 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  Hypothyroid 2009    Iron deficiency anemia 2/26/2013    Mononucleosis elementary, middle school, high school    PCOS (polycystic ovarian syndrome) 9/29/2017    PCOS (polycystic ovarian syndrome) 09/2017    Vertigo 8/25/2017    Prn meclizine.  Trigger sinus infection    Vitamin D deficiency 4/7/2015       Past Surgical History:   Procedure Laterality Date    HX  SECTION      HX TYMPANOSTOMY Bilateral infant    INSERT PICC LINE Left     d/t abscess after        Social History   Substance Use Topics    Smoking status: Former Smoker     Packs/day: 0.75     Years: 3.00     Types: Cigarettes     Quit date: 2005    Smokeless tobacco: Never Used    Alcohol use 0.5 oz/week     1 Glasses of wine per week      Comment: rare       Family History   Problem Relation Age of Onset    Hypertension Father     Stroke Father      TIA    Cancer Father      spinal    High Cholesterol Mother     Other Mother      prediabetes    Heart Disease Mother      irregular heartbeat    Endometriosis Mother     Cancer Maternal Grandmother      lung     Cancer Paternal Grandmother      lymphoma/ brain     Diabetes Paternal Grandmother     Heart Disease Paternal Grandmother     Heart Disease Maternal Grandfather     Cancer Maternal Grandfather      skin- melanoma    Hypertension Brother     Depression Brother     No Known Problems Son        Outpatient Prescriptions Marked as Taking for the 17 encounter (Office Visit) with Yefri White NP   Medication Sig Dispense Refill    metFORMIN (GLUCOPHAGE) 500 mg tablet Take 1 Tab by mouth two (2) times daily (with meals) for 30 days. Indications: Polycystic Ovarian Syndrome 60 Tab 2    [] trimethoprim-sulfamethoxazole (BACTRIM DS, SEPTRA DS) 160-800 mg per tablet Take 1 Tab by mouth two (2) times a day for 5 days. 10 Tab 0    fluticasone (FLONASE) 50 mcg/actuation nasal spray 2 Sprays by Both Nostrils route daily.  loratadine 10 mg cap Take  by mouth.  albuterol (PROVENTIL HFA, VENTOLIN HFA, PROAIR HFA) 90 mcg/actuation inhaler Take 2 Puffs by inhalation every four (4) hours as needed for Wheezing or Shortness of Breath (cough). 1 Inhaler 0    MV-MN/IRON/FOLIC ACID/HERB 622 (VITAMIN D3 COMPLETE PO) Take  by mouth.       ascorbic acid, vitamin C, (VITAMIN C) 250 mg tablet Take  by mouth. Allergies   Allergen Reactions    Morphine Other (comments)     Tremors       ROS:  Reviewed comprehensive ROS, negative except for issues noted in HPI     PE:  Visit Vitals    /77 (BP 1 Location: Left arm, BP Patient Position: Sitting)    Pulse 90    Temp 97.6 °F (36.4 °C) (Oral)    Resp 18    Ht 5' 7\" (1.702 m)    Wt 286 lb (129.7 kg)    LMP 11/18/2017    SpO2 96%    BMI 44.79 kg/m2     Gen: alert, oriented, no acute distress  Head: normocephalic, atraumatic  Ears: external auditory canals clear, TMs without erythema or effusion  Eyes: pupils equal round reactive to light, sclera clear, conjunctiva clear  Oral: moist mucus membranes, no oral lesions, no pharyngeal inflammation or exudate  Neck: symmetric normal sized thyroid, no carotid bruits, no jugular vein distention  Resp: no increase work of breathing, lungs clear to ausculation bilaterally, no wheezing, rales or rhonchi  CV: S1, S2 normal.  No murmurs, rubs, or gallops. Abd: soft, not tender, not distended. No hepatosplenomegaly. Normal bowel sounds. No hernias. No abdominal or renal bruits. Neuro: cranial nerves intact, normal strength and movement in all extremities, reflexes and sensation intact and symmetric. Skin: no lesion or rash  Extremities: no cyanosis or edema    Assessment/Plan:    ICD-10-CM ICD-9-CM    1. Dysuria R30.0 788.1 trimethoprim-sulfamethoxazole (BACTRIM DS, SEPTRA DS) 160-800 mg per tablet      CULTURE, URINE      AMB POC URINALYSIS DIP STICK AUTO W/ MICRO   2.  PCOS (polycystic ovarian syndrome) E28.2 256.4 metFORMIN (GLUCOPHAGE) 500 mg tablet   3. DUB (dysfunctional uterine bleeding) N93.8 626.8 trimethoprim-sulfamethoxazole (BACTRIM DS, SEPTRA DS) 160-800 mg per tablet     Follow-up Disposition:  Return in about 3 months (around 3/18/2018), or if symptoms worsen or fail to improve.  lab results and schedule of future lab studies reviewed with patient  reviewed medications and side effects in detail    Health Maintenance reviewed - reviewed, UTD. Recommended healthy diet low in carbohydrates, fats, sodium and cholesterol. Recommended regular cardiovascular exercise 3-6 times per week for 30-60 minutes daily. Chart is reviewed and updated today in the office. Records requested for other providers patient has seen and is currently seeing. Verbal and written instructions (see AVS) provided. Patient expresses understanding of diagnosis and treatment plan.

## 2017-12-18 NOTE — MR AVS SNAPSHOT
Visit Information Date & Time Provider Department Dept. Phone Encounter #  
 12/18/2017  4:00 PM Juanita Ortega NP [facility] [phone] [id number] Follow-up Instructions Return in about 3 months (around 3/18/2018), or if symptoms worsen or fail to improve. Upcoming Health Maintenance Date Due  
 PAP AKA CERVICAL CYTOLOGY 5/26/2019 DTaP/Tdap/Td series (2 - Td) 5/26/2026 Allergies as of 12/18/2017  Review Complete On: 12/18/2017 By: Juanita Ortega NP Severity Noted Reaction Type Reactions Morphine  02/26/2013    Other (comments) Tremors Current Immunizations  Reviewed on 12/2/2014 Name Date Influenza Vaccine 10/8/2014 Not reviewed this visit You Were Diagnosed With   
  
 Codes Comments Dysuria    -  Primary ICD-10-CM: R30.0 ICD-9-CM: 788.1 PCOS (polycystic ovarian syndrome)     ICD-10-CM: E28.2 ICD-9-CM: 256.4 DUB (dysfunctional uterine bleeding)     ICD-10-CM: N93.8 ICD-9-CM: 626.8 Vitals BP Pulse Temp Resp Height(growth percentile) Weight(growth percentile) 125/77 (BP 1 Location: Left arm, BP Patient Position: Sitting) 90 97.6 °F (36.4 °C) (Oral) 18 5' 7\" (1.702 m) 286 lb (129.7 kg) LMP SpO2 BMI OB Status Smoking Status 11/18/2017 96% 44.79 kg/m2 Unknown Former Smoker BMI and BSA Data Body Mass Index Body Surface Area 44.79 kg/m 2 2.48 m 2 Preferred Pharmacy Pharmacy Name Phone [facility] [address] [phone] Your Updated Medication List  
  
   
This list is accurate as of: 12/18/17  4:49 PM.  Always use your most recent med list.  
  
  
  
  
 albuterol 90 mcg/actuation inhaler Commonly known as:  PROVENTIL HFA, VENTOLIN HFA, PROAIR HFA Take 2 Puffs by inhalation every four (4) hours as needed for Wheezing or Shortness of Breath (cough). FLONASE 50 mcg/actuation nasal spray Generic drug:  fluticasone 2 Sprays by Both Nostrils route daily. loratadine 10 mg Cap Take  by mouth.  
  
 metFORMIN 500 mg tablet Commonly known as:  GLUCOPHAGE Take 1 Tab by mouth two (2) times daily (with meals) for 30 days. Indications: Polycystic Ovarian Syndrome  
  
 trimethoprim-sulfamethoxazole 160-800 mg per tablet Commonly known as:  BACTRIM DS, SEPTRA DS Take 1 Tab by mouth two (2) times a day for 5 days. VITAMIN C 250 mg tablet Generic drug:  ascorbic acid (vitamin C) Take  by mouth. VITAMIN D3 COMPLETE PO Take  by mouth. Prescriptions Sent to Pharmacy Refills  
 metFORMIN (GLUCOPHAGE) 500 mg tablet 2 Sig: Take 1 Tab by mouth two (2) times daily (with meals) for 30 days. Indications: Polycystic Ovarian Syndrome Class: Normal  
 Pharmacy: [address] Ph #: [phone] Route: Oral  
 trimethoprim-sulfamethoxazole (BACTRIM DS, SEPTRA DS) 160-800 mg per tablet 0 Sig: Take 1 Tab by mouth two (2) times a day for 5 days. Class: Normal  
 Pharmacy: [address] Ph #: [phone] Route: Oral  
  
We Performed the Following AMB POC URINALYSIS DIP STICK AUTO W/ MICRO [85514 CPT(R)] CULTURE, URINE [id number] CPT(R)] Follow-up Instructions Return in about 3 months (around 3/18/2018), or if symptoms worsen or fail to improve. Introducing [facility] SERVICES! Dear Jim Gr: Thank you for requesting a [employer] account. Our records indicate that you already have an active [employer] account. You can access your account anytime at [email or web address] Did you know that you can access your hospital and ER discharge instructions at any time in [employer]? You can also review all of your test results from your hospital stay or ER visit. Additional Information If you have questions, please visit the Frequently Asked Questions section of the [employer] website at [email or web address] Remember, [employer] is NOT to be used for urgent needs. For medical emergencies, dial 911. Now available from your iPhone and Android! Please provide this summary of care documentation to your next provider. Your primary care clinician is listed as Ayaka Lloyd. If you have any questions after today's visit, please call [phone].

## 2017-12-18 NOTE — PROGRESS NOTES
Reviewed record in preparation for visit and have obtained necessary documentation. Identified pt with two pt identifiers(name and ). Chief Complaint   Patient presents with    Follow-up     6 week weight f/u    Medication Refill    PCOS    Urinary Pain     Concerned that she may have a UTI       Vitals:    17 1624   BP: 125/77   Pulse: 90   Resp: 18   Temp: 97.6 °F (36.4 °C)   TempSrc: Oral   SpO2: 96%   Weight: 286 lb (129.7 kg)   Height: 5' 7\" (1.702 m)   PainSc:   0 - No pain   LMP: 2017     Body Weight: 286lbs  Body Fat: 44.8%  Muscle Mass: 37.1  Body H2O: 42.0%  BMR:   BMI: 44.4  Weight Lost/Gained since the last visit: None  Weight Lost since Surgery: Has not had surgery      Coordination of Care Questionnaire:  :     1) Have you been to an emergency room, urgent care clinic since your last visit? no   Hospitalized since your last visit? no             2) Have you seen or consulted any other health care providers outside of [facility] since your last visit? no  (Include any pap smears or colon screenings in this section.)    3) In the event something were to happen to you and you were unable to speak on your behalf, do you have an Advance Directive/ Living Will in place stating your wishes? NO    Do you have an Advance Directive on file? no    4) Are you interested in receiving information on Advance Directives?  NO

## 2017-12-21 LAB — BACTERIA UR CULT: ABNORMAL

## 2018-01-02 NOTE — PROGRESS NOTES
Urine culture positive for E. Coli. Bactrim DS antibiotic should have taken care of the UTI. If symptoms are not resolved or have returned, please call the office for follow-up.

## 2018-03-28 NOTE — TELEPHONE ENCOUNTER
Med ordered 11/20/17 by AREN Casas NP for PCOS. PCP: Sebastián Newman. Balaji Dean MD    Last appt: 12/18/2017  No future appointments. Requested Prescriptions     Pending Prescriptions Disp Refills    metFORMIN (GLUCOPHAGE) 500 mg tablet 60 Tab 2     Sig: Take 1 Tab by mouth two (2) times daily (with meals).      Lab Results   Component Value Date/Time    Sodium 141 11/20/2017 03:47 PM    Potassium 4.2 11/20/2017 03:47 PM    Chloride 99 11/20/2017 03:47 PM    CO2 27 11/20/2017 03:47 PM    Glucose 104 (H) 11/20/2017 03:47 PM    BUN 12 11/20/2017 03:47 PM    Creatinine 0.71 11/20/2017 03:47 PM    BUN/Creatinine ratio 17 11/20/2017 03:47 PM    GFR est  11/20/2017 03:47 PM    GFR est non- 11/20/2017 03:47 PM    Calcium 9.8 11/20/2017 03:47 PM     Lab Results   Component Value Date/Time    Hemoglobin A1c 5.5 08/25/2017 12:12 PM    Hemoglobin A1c (POC) 5.1 01/17/2014 06:21 PM     Lab Results   Component Value Date/Time    Cholesterol, total 170 08/25/2017 12:12 PM    HDL Cholesterol 27 (L) 08/25/2017 12:12 PM    LDL, calculated 86 08/25/2017 12:12 PM    VLDL, calculated 57 (H) 08/25/2017 12:12 PM    Triglyceride 283 (H) 08/25/2017 12:12 PM     Lab Results   Component Value Date/Time    TSH 3.290 08/25/2017 12:12 PM

## 2018-03-30 RX ORDER — METFORMIN HYDROCHLORIDE 500 MG/1
500 TABLET ORAL 2 TIMES DAILY WITH MEALS
Qty: 60 TAB | Refills: 2 | Status: SHIPPED | OUTPATIENT
Start: 2018-03-30 | End: 2018-05-01 | Stop reason: SDUPTHER

## 2018-05-01 NOTE — TELEPHONE ENCOUNTER
PCP: Paula Jensen. Jose Gao MD    Last appt: 12/18/2017  No future appointments. Requested Prescriptions     Pending Prescriptions Disp Refills    metFORMIN (GLUCOPHAGE) 500 mg tablet 60 Tab 2     Sig: Take 1 Tab by mouth two (2) times daily (with meals).      Lab Results   Component Value Date/Time    Sodium 141 11/20/2017 03:47 PM    Potassium 4.2 11/20/2017 03:47 PM    Chloride 99 11/20/2017 03:47 PM    CO2 27 11/20/2017 03:47 PM    Glucose 104 (H) 11/20/2017 03:47 PM    BUN 12 11/20/2017 03:47 PM    Creatinine 0.71 11/20/2017 03:47 PM    BUN/Creatinine ratio 17 11/20/2017 03:47 PM    GFR est  11/20/2017 03:47 PM    GFR est non- 11/20/2017 03:47 PM    Calcium 9.8 11/20/2017 03:47 PM     Lab Results   Component Value Date/Time    Hemoglobin A1c 5.5 08/25/2017 12:12 PM    Hemoglobin A1c (POC) 5.1 01/17/2014 06:21 PM     Lab Results   Component Value Date/Time    Cholesterol, total 170 08/25/2017 12:12 PM    HDL Cholesterol 27 (L) 08/25/2017 12:12 PM    LDL, calculated 86 08/25/2017 12:12 PM    VLDL, calculated 57 (H) 08/25/2017 12:12 PM    Triglyceride 283 (H) 08/25/2017 12:12 PM     Lab Results   Component Value Date/Time    TSH 3.290 08/25/2017 12:12 PM

## 2018-05-01 NOTE — TELEPHONE ENCOUNTER
Requested Prescriptions     Pending Prescriptions Disp Refills    metFORMIN (GLUCOPHAGE) 500 mg tablet 60 Tab 2     Sig: Take 1 Tab by mouth two (2) times daily (with meals).

## 2018-05-02 RX ORDER — METFORMIN HYDROCHLORIDE 500 MG/1
500 TABLET ORAL 2 TIMES DAILY WITH MEALS
Qty: 180 TAB | Refills: 3 | Status: SHIPPED | OUTPATIENT
Start: 2018-05-02 | End: 2019-05-06 | Stop reason: SDUPTHER

## 2019-05-06 ENCOUNTER — OFFICE VISIT (OUTPATIENT)
Dept: FAMILY MEDICINE CLINIC | Age: 34
End: 2019-05-06

## 2019-05-06 VITALS
HEIGHT: 67 IN | RESPIRATION RATE: 20 BRPM | DIASTOLIC BLOOD PRESSURE: 76 MMHG | HEART RATE: 94 BPM | BODY MASS INDEX: 44.54 KG/M2 | WEIGHT: 283.8 LBS | OXYGEN SATURATION: 98 % | TEMPERATURE: 98.3 F | SYSTOLIC BLOOD PRESSURE: 118 MMHG

## 2019-05-06 DIAGNOSIS — Z86.39 HISTORY OF HYPOTHYROIDISM: ICD-10-CM

## 2019-05-06 DIAGNOSIS — J30.2 SEASONAL ALLERGIC RHINITIS, UNSPECIFIED TRIGGER: ICD-10-CM

## 2019-05-06 DIAGNOSIS — Z13.1 DIABETES MELLITUS SCREENING: ICD-10-CM

## 2019-05-06 DIAGNOSIS — R10.10 PAIN OF UPPER ABDOMEN: ICD-10-CM

## 2019-05-06 DIAGNOSIS — Z00.00 LABORATORY EXAM ORDERED AS PART OF ROUTINE GENERAL MEDICAL EXAMINATION: ICD-10-CM

## 2019-05-06 DIAGNOSIS — E66.01 OBESITY, CLASS III, BMI 40-49.9 (MORBID OBESITY) (HCC): ICD-10-CM

## 2019-05-06 DIAGNOSIS — E28.2 PCOS (POLYCYSTIC OVARIAN SYNDROME): Primary | ICD-10-CM

## 2019-05-06 DIAGNOSIS — E55.9 VITAMIN D DEFICIENCY: ICD-10-CM

## 2019-05-06 DIAGNOSIS — K59.00 CONSTIPATION, UNSPECIFIED CONSTIPATION TYPE: ICD-10-CM

## 2019-05-06 DIAGNOSIS — E78.5 DYSLIPIDEMIA: ICD-10-CM

## 2019-05-06 DIAGNOSIS — R53.83 FATIGUE, UNSPECIFIED TYPE: ICD-10-CM

## 2019-05-06 DIAGNOSIS — D50.9 IRON DEFICIENCY ANEMIA, UNSPECIFIED IRON DEFICIENCY ANEMIA TYPE: ICD-10-CM

## 2019-05-06 PROBLEM — N92.6 IRREGULAR PERIODS: Status: ACTIVE | Noted: 2019-04-15

## 2019-05-06 PROBLEM — R10.2 PAIN IN PELVIS: Status: ACTIVE | Noted: 2019-04-15

## 2019-05-06 PROBLEM — R42 VERTIGO: Status: RESOLVED | Noted: 2017-08-25 | Resolved: 2019-05-06

## 2019-05-06 RX ORDER — SENNOSIDES 8.6 MG/1
TABLET ORAL
COMMUNITY

## 2019-05-06 RX ORDER — DOCUSATE SODIUM 100 MG/1
CAPSULE, LIQUID FILLED ORAL
COMMUNITY

## 2019-05-06 RX ORDER — LEVONORGESTREL AND ETHINYL ESTRADIOL 0.1-0.02MG
KIT ORAL
COMMUNITY
End: 2019-10-09 | Stop reason: ALTCHOICE

## 2019-05-06 RX ORDER — GLUCOSAMINE SULFATE 1500 MG
1000 POWDER IN PACKET (EA) ORAL DAILY
COMMUNITY

## 2019-05-06 RX ORDER — METFORMIN HYDROCHLORIDE 500 MG/1
500 TABLET ORAL 2 TIMES DAILY WITH MEALS
Qty: 180 TAB | Refills: 3 | Status: SHIPPED | OUTPATIENT
Start: 2019-05-06 | End: 2019-10-09 | Stop reason: ALTCHOICE

## 2019-05-06 NOTE — PATIENT INSTRUCTIONS
Learning About Low-Carbohydrate Diets for Weight Loss What is a low-carbohydrate diet? Low-carb diets avoid foods that are high in carbohydrate. These high-carb foods include pasta, bread, rice, cereal, fruits, and starchy vegetables. Instead, these diets usually have you eat foods that are high in fat and protein. Many people lose weight quickly on a low-carb diet. But the early weight loss is water. People on this diet often gain the weight back after they start eating carbs again. Not all diet plans are safe or work well. A lot of the evidence shows that low-carb diets aren't healthy. That's because these diets often don't include healthy foods like fruits and vegetables. Losing weight safely means balancing protein, fat, and carbs with every meal and snack. And low-carb diets don't always provide the vitamins, minerals, and fiber you need. If you have a serious medical condition, talk to your doctor before you try any diet. These conditions include kidney disease, heart disease, type 2 diabetes, high cholesterol, and high blood pressure. If you are pregnant, it may not be safe for your baby if you are on a low-carb diet. How can you lose weight safely? You might have heard that a diet plan helped another person lose weight. But that doesn't mean that it will work for you. It is very hard to stay on a diet that includes lots of big changes in your eating habits. If you want to get to a healthy weight and stay there, making healthy lifestyle changes will often work better than dieting. These steps can help. · Make a plan for change. Work with your doctor to create a plan that is right for you. · See a dietitian. He or she can show you how to make healthy changes in your eating habits. · Manage stress. If you have a lot of stress in your life, it can be hard to focus on making healthy changes to your daily habits. · Track your food and activity.  You are likely to do better at losing weight if you keep track of what you eat and what you do. Follow-up care is a key part of your treatment and safety. Be sure to make and go to all appointments, and call your doctor if you are having problems. It's also a good idea to know your test results and keep a list of the medicines you take. Where can you learn more? Go to http://amarjit-hipolito.info/. Enter A121 in the search box to learn more about \"Learning About Low-Carbohydrate Diets for Weight Loss. \" Current as of: March 28, 2018 Content Version: 11.9 © 9233-5290 [employer]. Care instructions adapted under license by [employer] (which disclaims liability or warranty for this information). If you have questions about a medical condition or this instruction, always ask your healthcare professional. [address] any warranty or liability for your use of this information. Constipation: Care Instructions Your Care Instructions Constipation means that you have a hard time passing stools (bowel movements). People pass stools from 3 times a day to once every 3 days. What is normal for you may be different. Constipation may occur with pain in the rectum and cramping. The pain may get worse when you try to pass stools. Sometimes there are small amounts of bright red blood on toilet paper or the surface of stools. This is because of enlarged veins near the rectum (hemorrhoids). A few changes in your diet and lifestyle may help you avoid ongoing constipation. Your doctor may also prescribe medicine to help loosen your stool. Some medicines can cause constipation. These include pain medicines and antidepressants. Tell your doctor about all the medicines you take. Your doctor may want to make a medicine change to ease your symptoms. Follow-up care is a key part of your treatment and safety.  Be sure to make and go to all appointments, and call your doctor if you are having problems. It's also a good idea to know your test results and keep a list of the medicines you take. How can you care for yourself at home? · Drink plenty of fluids, enough so that your urine is light yellow or clear like water. If you have kidney, heart, or liver disease and have to limit fluids, talk with your doctor before you increase the amount of fluids you drink. · Include high-fiber foods in your diet each day. These include fruits, vegetables, beans, and whole grains. · Get at least 30 minutes of exercise on most days of the week. Walking is a good choice. You also may want to do other activities, such as running, swimming, cycling, or playing tennis or team sports. · Take a fiber supplement, such as Citrucel or Metamucil, every day. Read and follow all instructions on the label. · Schedule time each day for a bowel movement. A daily routine may help. Take your time having your bowel movement. · Support your feet with a small step stool when you sit on the toilet. This helps flex your hips and places your pelvis in a squatting position. · Your doctor may recommend an over-the-counter laxative to relieve your constipation. Examples are Milk of Magnesia and MiraLax. Read and follow all instructions on the label. Do not use laxatives on a long-term basis. When should you call for help? Call your doctor now or seek immediate medical care if: 
  · You have new or worse belly pain.  
  · You have new or worse nausea or vomiting.  
  · You have blood in your stools.  
 Watch closely for changes in your health, and be sure to contact your doctor if: 
  · Your constipation is getting worse.  
  · You do not get better as expected. Where can you learn more? Go to http://amarjit-hipolito.info/. Enter 21  in the search box to learn more about \"Constipation: Care Instructions. \" Current as of: September 23, 2018 Content Version: 11.9 © 7882-9030 [employer], [employer]. Care instructions adapted under license by [employer] (which disclaims liability or warranty for this information). If you have questions about a medical condition or this instruction, always ask your healthcare professional. [address] any warranty or liability for your use of this information. Constipation: 
 
Constipation is a common problem for many patients. Many medications, lower daily  fluid intake, etc may cause acute constipation. Constipation should be relieved in most cases by one or more of the following suggestions: 
 
1. Increase your daily fluid intake. Aim for a minimum of 48-64oz daily. 2.  Increased mobility. Move as much as possible. Walking not only improves circulation but it is also helpful for gastrointestinal motility. 3.  Stool softeners (non habit forming). A.  Colace (Docusate) - Over the Counter, may take 100mg capsule up to 3 times daily for a maximum of 300mg daily. 4.  Laxatives - If stool softeners are not providing relief OR if you have not had a BM in 3 or more days. It is essential that you Laxative use for extended periods of time may be habit forming. Please ask your provider if you are concerned about how long you have been taking them. A.  Miralax - mix 1 capful into any drink of your choice daily for up to 7 days. Can increase to two capfuls a day 
 OR 
 B. Milk of Magnesia - Take 30mL (2 Tbsp) to 60mL (4Tbsp) daily. OR 
 C. Senna (PUSH) (senokot) 8.5mg tablets. Take 1-2 every night. 5.  Rectal Suppositories/Fleets Enema - When the use of stool softeners or laxatives have been ineffective in relieving your constipation symptoms you may find a rectal suppository or fleets enema effective. Occasionally stool has hardened at the base of the rectum making the above interventions ineffective.   A suppository or enema can remove this hardened stool so the above interventions will then be effective. GOAL: one soft bowel movement daily If the above methods are ineffective, please call our office to schedule an appointment to discuss next steps to help you relieve your constipation. You may require a prescription medication to eliminate your constipation.

## 2019-05-06 NOTE — PROGRESS NOTES
Jessica Guerrero  Identified pt with two pt identifiers(name and ). Chief Complaint Patient presents with  Follow-up Room 5  
 Other  
  discuss other issues with provider 1. Have you been to the ER, urgent care clinic since your last visit?n   Hospitalized since your last visit? n  
 
2. Have you seen or consulted any other health care providers outside of the [address] [place] [address] since your last visit? Include any pap smears or colon screening. n  
 
 
Advance Care Planning In the event something were to happen to you and you were unable to speak on your behalf, do you have an Advance Directive/ Living Will in place stating your wishes? NO If yes, do we have a copy on file NO If no, would you like information NO Medication reconciliation up to date and corrected with patient at this time. Today's provider has been notified of reason for visit, vitals and flowsheets obtained on patients. Reviewed record in preparation for visit, huddled with provider and have obtained necessary documentation. Health Maintenance Due Topic  Pneumococcal 0-64 years (1 of 1 - PPSV23)  PAP AKA CERVICAL CYTOLOGY Wt Readings from Last 3 Encounters:  
19 283 lb 12.8 oz (128.7 kg) 17 286 lb (129.7 kg) 17 286 lb 11.2 oz (130 kg) Temp Readings from Last 3 Encounters:  
19 98.3 °F (36.8 °C) (Oral) 17 97.6 °F (36.4 °C) (Oral)  
17 97.2 °F (36.2 °C) (Oral) BP Readings from Last 3 Encounters:  
19 118/76  
17 125/77  
17 127/76 Pulse Readings from Last 3 Encounters:  
19 94  
17 90  
17 91 Vitals:  
 19 3246 BP: 118/76 Pulse: 94 Resp: 20 Temp: 98.3 °F (36.8 °C) TempSrc: Oral  
SpO2: 98% Weight: 283 lb 12.8 oz (128.7 kg) Height: 5' 7\" (1.702 m) PainSc:   0 - No pain LMP: 2019 Learning Assessment: 
:  
 
Learning Assessment 2014 PRIMARY LEARNER Patient Patient HIGHEST LEVEL OF EDUCATION - PRIMARY LEARNER  4 YEARS OF COLLEGE 4 YEARS OF COLLEGE  
BARRIERS PRIMARY LEARNER NONE NONE  
CO-LEARNER CAREGIVER - No  
BARRIERS CO-LEARNER - NONE PRIMARY LANGUAGE ENGLISH ENGLISH  
PRIMARY LANGUAGE CO-LEARNER - OTHER (COMMENTS)  NEED - No  
LEARNER PREFERENCE PRIMARY OTHER (COMMENT) OTHER (COMMENT) LEARNING SPECIAL TOPICS no no ANSWERED BY patient patient RELATIONSHIP - SELF  
ASSESSMENT COMMENT - no Depression Screening: 
:  
 
3 most recent PHQ Screens 5/6/2019 Little interest or pleasure in doing things Not at all Feeling down, depressed, irritable, or hopeless Not at all Total Score PHQ 2 0 No flowsheet data found. Fall Risk Assessment: 
:  
 
Fall Risk Assessment, last 12 mths 9/29/2017 Able to walk? Yes Fall in past 12 months? No  
 
 
Abuse Screening: 
:  
 
Abuse Screening Questionnaire 9/29/2017 Do you ever feel afraid of your partner? Emmanuel Duffy Are you in a relationship with someone who physically or mentally threatens you? Emmanuel Duffy Is it safe for you to go home? Y  
 
 
ADL Screening: 
:  
 
ADL Assessment 5/6/2019 Feeding yourself No Help Needed Getting from bed to chair No Help Needed Getting dressed No Help Needed Bathing or showering No Help Needed Walk across the room (includes cane/walker) No Help Needed Using the telphone No Help Needed Taking your medications No Help Needed Preparing meals No Help Needed Managing money (expenses/bills) No Help Needed Moderately strenuous housework (laundry) No Help Needed Shopping for personal items (toiletries/medicines) No Help Needed Shopping for groceries No Help Needed Driving No Help Needed Climbing a flight of stairs No Help Needed Getting to places beyond walking distances No Help Needed BMI: 
Weight Metrics 5/6/2019 12/18/2017 11/20/2017 10/23/2017 9/29/2017 8/25/2017 5/26/2016 Weight 283 lb 12.8 oz 286 lb 286 lb 11.2 oz 286 lb 289 lb 14.4 oz 289 lb 276 lb 9.6 oz  
BMI 44.45 kg/m2 44.79 kg/m2 44.9 kg/m2 44.79 kg/m2 45.4 kg/m2 45.26 kg/m2 43.31 kg/m2 Medication reconciliation up to date and corrected with patient at this time.

## 2019-05-06 NOTE — PROGRESS NOTES
Chief Complaint Patient presents with  Follow-up Room 5  
 Other  
  discuss other issues with provider HPI: 
The patient is a 27 y.o. female who presents today for a follow up appointment. No hospital, ER or specialist visits since last primary care visit except as noted below. The patient is a former patient of Dr. Mark Cannon who has since left our practice. She is here to re-establish care today. PCOS/GYN: 
-started on Metformin, has tolerated this well. She is taking both at dinner time without issue. 
-saw GYN for Pap on 4/1/2019: normal pap at that time 
-Labs done: A1C: 5.5, CMP WNL, Testosterone WNL, CBC WNL Abdominal Pain: 
-present x approximately 1 year but unsure, in grad school FT, working FT, and taking care of her kids 
-had painful intercourse, saw OB, had negative pelvic U/S, advised to see PCP for GI referral 
 
Review of Systems A comprehensive review of systems was negative except for that written in the HPI. Patient Active Problem List  
Diagnosis Code  Allergic rhinitis J30.9  History of hypothyroidism Z86.39  
 Iron deficiency anemia D50.9  Obesity, Class III, BMI 40-49.9 (morbid obesity) (HCC) E66.01  
 Dyslipidemia E78.5  Vitamin D deficiency E55.9  Family history of melanoma Z80.8  PCOS (polycystic ovarian syndrome) E28.2  Irregular periods N92.6  Pain in pelvis R10.2  Pain of upper abdomen R10.10  Constipation K59.00 Past Medical History:  
Diagnosis Date  Abnormal Pap smear 07/07/08  Abnormal Pap smear of cervix 2012  
 saw OBGYN  
 Allergy, unspecified not elsewhere classified  Childhood asthma childhood  
 may need alb with URI  DUB (dysfunctional uterine bleeding) 2012, recurred ~ 2015  Dyslipidemia 4/7/2015  Family history of melanoma  Hand pain 2/26/2013 Bilaterally. 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  Hypothyroid 2009  Iron deficiency anemia 2/26/2013  Mononucleosis elementary, middle school, high school  PCOS (polycystic ovarian syndrome) 2017  PCOS (polycystic ovarian syndrome) 2017  Vertigo 2017 Prn meclizine. Trigger sinus infection  Vitamin D deficiency 2015 Past Surgical History:  
Procedure Laterality Date  HX  SECTION    HX TYMPANOSTOMY Bilateral infant  INSERT PICC LINE Left   
 d/t abscess after  Social History Tobacco Use  Smoking status: Former Smoker Packs/day: 0.75 Years: 3.00 Pack years: 2.25 Types: Cigarettes Last attempt to quit: 2005 Years since quittin.3  Smokeless tobacco: Never Used Substance Use Topics  Alcohol use: Yes Alcohol/week: 0.5 oz Types: 1 Glasses of wine per week Comment: rare  Drug use: No  
 
 
Family History Problem Relation Age of Onset  Hypertension Father  Stroke Father TIA  Cancer Father   
     spinal  
 High Cholesterol Mother  Other Mother   
     prediabetes  Heart Disease Mother   
     irregular heartbeat  Endometriosis Mother  Cancer Maternal Grandmother   
     lung  Cancer Paternal Grandmother   
     lymphoma/ brain  Diabetes Paternal Grandmother  Heart Disease Paternal Grandmother  Heart Disease Maternal Grandfather  Cancer Maternal Grandfather   
     skin- melanoma  Hypertension Brother  Depression Brother  No Known Problems Son   
 
 
 
 
Current Outpatient Medications on File Prior to Visit Medication Sig Dispense Refill  cholecalciferol (VITAMIN D3) 1,000 unit cap Vitamin D3    
 senna (SENNA LAX) 8.6 mg tablet Senna Lax  docusate sodium (STOOL SOFTENER) 100 mg capsule Stool Softener  bran-gum-fib-dana-psyl-kelp-pec (FIBER 6) 1,000 mg tab fiber  levonorgestrel-ethinyl estradiol (AVIANE) 0.1-20 mg-mcg tab Aviane 0.1 mg-20 mcg tablet Take 1 tablet every day by oral route.  fluticasone (FLONASE) 50 mcg/actuation nasal spray 2 Sprays by Both Nostrils route daily.  loratadine 10 mg cap Take  by mouth.  albuterol (PROVENTIL HFA, VENTOLIN HFA, PROAIR HFA) 90 mcg/actuation inhaler Take 2 Puffs by inhalation every four (4) hours as needed for Wheezing or Shortness of Breath (cough). 1 Inhaler 0  
 MV-MN/IRON/FOLIC ACID/HERB 221 (VITAMIN D3 COMPLETE PO) Take  by mouth.  ascorbic acid, vitamin C, (VITAMIN C) 250 mg tablet Take  by mouth. No current facility-administered medications on file prior to visit. Allergies Allergen Reactions  Morphine Other (comments) and Seizures Tremors PE: 
Visit Vitals /76 (BP 1 Location: Right arm, BP Patient Position: Sitting) Pulse 94 Temp 98.3 °F (36.8 °C) (Oral) Resp 20 Ht 5' 7\" (1.702 m) Wt 283 lb 12.8 oz (128.7 kg) LMP 05/01/2019 SpO2 98% BMI 44.45 kg/m² Gen: alert, oriented, no acute distress Head: normocephalic, atraumatic Ears: external auditory canals clear, TMs without erythema or effusion Eyes: pupils equal round reactive to light, sclera clear, conjunctiva clear Oral: moist mucus membranes, no oral lesions, no pharyngeal inflammation or exudate Neck: symmetric normal sized thyroid, no carotid bruits, no jugular vein distention Resp: no increase work of breathing, lungs clear to ausculation bilaterally, no wheezing, rales or rhonchi CV: S1, S2 normal.  No murmurs, rubs, or gallops. Abd: soft, not tender, not distended. No hepatosplenomegaly. Normal bowel sounds. No hernias. No abdominal or renal bruits. Neuro: cranial nerves intact, normal strength and movement in all extremities, reflexes and sensation intact and symmetric. Skin: no lesion or rash Extremities: no cyanosis or edema No results found for this visit on 05/06/19. Assessment/Plan: ICD-10-CM ICD-9-CM 1.  PCOS (polycystic ovarian syndrome) E28.2 256.4 URINALYSIS W/ RFLX MICROSCOPIC  
 TSH 3RD GENERATION  
   VITAMIN B12  
   IRON PROFILE FERRITIN  
   metFORMIN (GLUCOPHAGE) 500 mg tablet 2. History of hypothyroidism Z86.39 V12.29 TSH 3RD GENERATION 3. Iron deficiency anemia, unspecified iron deficiency anemia type D50.9 280.9 IRON PROFILE FERRITIN  
4. Obesity, Class III, BMI 40-49.9 (morbid obesity) ([facility]) E66.01 278.01 URINALYSIS W/ RFLX MICROSCOPIC  
   LIPID PANEL  
   TSH 3RD GENERATION  
   VITAMIN D, 25 HYDROXY  
   VITAMIN B12  
   IRON PROFILE FERRITIN 5. Vitamin D deficiency E55.9 268.9 VITAMIN D, 25 HYDROXY 6. Dyslipidemia E78.5 272.4 LIPID PANEL 7. Laboratory exam ordered as part of routine general medical examination Z00.00 V72.62 URINALYSIS W/ RFLX MICROSCOPIC  
   LIPID PANEL  
   TSH 3RD GENERATION  
   VITAMIN D, 25 HYDROXY  
   VITAMIN B12  
   IRON PROFILE FERRITIN  
8. Fatigue, unspecified type R53.83 780.79 URINALYSIS W/ RFLX MICROSCOPIC  
   LIPID PANEL  
   TSH 3RD GENERATION  
   VITAMIN D, 25 HYDROXY  
   VITAMIN B12  
   IRON PROFILE FERRITIN 9. Diabetes mellitus screening Z13.1 V77.1 URINALYSIS W/ RFLX MICROSCOPIC 10. Seasonal allergic rhinitis, unspecified trigger J30.2 477.9 11. Pain of upper abdomen R10.10 789.09 REFERRAL TO GASTROENTEROLOGY 12. Constipation, unspecified constipation type K59.00 564.00 Follow-up and Dispositions · Return in about 3 months (around 8/6/2019) for Medication Check, Hypertension, PCOS, GI F/U. 
  
lab results and schedule of future lab studies reviewed with patient - reviewed recent OB labs, ordered other annual fasting labs not done, patient will have fasting labs before next visit. reviewed diet, exercise and weight control - discussed weight mgmt, used to see Dr. Olga Cagle for this, took appetite suppressant, unsure which one but thinks it was a stimulant.  
reviewed medications and side effects in detail 
 
lose weight, increase physical activity, routine labs ordered, call if any problems Health Maintenance reviewed - reviewed, UTD, Pap 4/1/2019. Recommended healthy diet low in carbohydrates, fats, sodium and cholesterol. Recommended regular cardiovascular exercise 3-6 times per week for 30-60 minutes daily. Chart is reviewed and updated today in the office. Records requested for other providers patient has seen and is currently seeing. Patient was offered a choice/choices in the treatment plan today. Patient expresses understanding of the plan and agrees with recommendations. Verbal and written instructions (see AVS) provided. See patient instructions for more. Patient expresses understanding of diagnosis and treatment plan.

## 2019-05-07 DIAGNOSIS — E78.5 DYSLIPIDEMIA: ICD-10-CM

## 2019-05-07 DIAGNOSIS — Z86.39 HISTORY OF HYPOTHYROIDISM: ICD-10-CM

## 2019-05-07 DIAGNOSIS — D50.9 IRON DEFICIENCY ANEMIA, UNSPECIFIED IRON DEFICIENCY ANEMIA TYPE: ICD-10-CM

## 2019-05-07 DIAGNOSIS — E55.9 VITAMIN D DEFICIENCY: ICD-10-CM

## 2019-05-07 DIAGNOSIS — E28.2 PCOS (POLYCYSTIC OVARIAN SYNDROME): ICD-10-CM

## 2019-05-07 DIAGNOSIS — E66.01 OBESITY, CLASS III, BMI 40-49.9 (MORBID OBESITY) (HCC): ICD-10-CM

## 2019-05-07 DIAGNOSIS — R53.83 FATIGUE, UNSPECIFIED TYPE: ICD-10-CM

## 2019-05-07 DIAGNOSIS — Z00.00 LABORATORY EXAM ORDERED AS PART OF ROUTINE GENERAL MEDICAL EXAMINATION: ICD-10-CM

## 2019-05-07 DIAGNOSIS — Z13.1 DIABETES MELLITUS SCREENING: ICD-10-CM

## 2019-05-30 LAB
25(OH)D3+25(OH)D2 SERPL-MCNC: 37.6 NG/ML (ref 30–100)
APPEARANCE UR: CLEAR
BILIRUB UR QL STRIP: NEGATIVE
CHOLEST SERPL-MCNC: 132 MG/DL (ref 100–199)
COLOR UR: YELLOW
FERRITIN SERPL-MCNC: 58 NG/ML (ref 15–150)
GLUCOSE UR QL: NEGATIVE
HDLC SERPL-MCNC: 32 MG/DL
HGB UR QL STRIP: NEGATIVE
INTERPRETATION, 910389: NORMAL
IRON SATN MFR SERPL: 20 % (ref 15–55)
IRON SERPL-MCNC: 67 UG/DL (ref 27–159)
KETONES UR QL STRIP: NEGATIVE
LDLC SERPL CALC-MCNC: 41 MG/DL (ref 0–99)
LEUKOCYTE ESTERASE UR QL STRIP: NEGATIVE
MICRO URNS: NORMAL
NITRITE UR QL STRIP: NEGATIVE
PH UR STRIP: 7.5 [PH] (ref 5–7.5)
PROT UR QL STRIP: NEGATIVE
SP GR UR: 1.02 (ref 1–1.03)
TIBC SERPL-MCNC: 338 UG/DL (ref 250–450)
TRIGL SERPL-MCNC: 295 MG/DL (ref 0–149)
TSH SERPL DL<=0.005 MIU/L-ACNC: 4.14 UIU/ML (ref 0.45–4.5)
UIBC SERPL-MCNC: 271 UG/DL (ref 131–425)
UROBILINOGEN UR STRIP-MCNC: 0.2 MG/DL (ref 0.2–1)
VIT B12 SERPL-MCNC: 442 PG/ML (ref 232–1245)
VLDLC SERPL CALC-MCNC: 59 MG/DL (ref 5–40)

## 2019-06-07 DIAGNOSIS — E78.1 HYPERTRIGLYCERIDEMIA: Primary | ICD-10-CM

## 2019-06-07 DIAGNOSIS — E03.9 HYPOTHYROIDISM, UNSPECIFIED TYPE: ICD-10-CM

## 2019-06-07 RX ORDER — LEVOTHYROXINE SODIUM 25 UG/1
25 TABLET ORAL
Qty: 30 TAB | Refills: 2 | Status: SHIPPED | OUTPATIENT
Start: 2019-06-07 | End: 2019-10-09 | Stop reason: SDUPTHER

## 2019-06-07 RX ORDER — FENOFIBRATE 48 MG/1
48 TABLET, COATED ORAL DAILY
Qty: 30 TAB | Refills: 2 | Status: SHIPPED | OUTPATIENT
Start: 2019-06-07 | End: 2019-10-09 | Stop reason: SDUPTHER

## 2019-06-07 NOTE — PROGRESS NOTES
Your lab results have been reviewed and are as follows:    Urine Test:  Your urine analysis is normal.    Cholesterol Panel: Total Cholesterol is 132 (goal <200). Triglycerides are 295. (goal <150). These are still high. We will start Tricor today, I have escribed this to your pharmacy. We will need to recheck this in 2-3 months. Your HDL or \"good\" cholesterol is 32. (goal >40). Your LDL or \"bad\" cholesterol is 41.  (goal <100). Thyroid:  Your thyroid function is abnormal.  We will start 25 mcg of levothyroxine at this time. I have escribed this to your pharmacy on file. We will need to recheck this in 1-2 months. Vitamin D is normal. Try to get 10-15 minutes of sunlight daily without burning and try to eat foods such as low fat dairy which is Vitamin D fortified. Your vitamin B12 is normal but on the low end of normal, we should consider Vitamin B12 injections at your next visit. Your Iron levels are normal.  Your Ferritin is normal.    Aishwarya Chawla, MSN, MHA, FNP-BC

## 2019-10-09 ENCOUNTER — OFFICE VISIT (OUTPATIENT)
Dept: FAMILY MEDICINE CLINIC | Age: 34
End: 2019-10-09

## 2019-10-09 VITALS
BODY MASS INDEX: 44.37 KG/M2 | OXYGEN SATURATION: 98 % | DIASTOLIC BLOOD PRESSURE: 85 MMHG | TEMPERATURE: 98.7 F | WEIGHT: 282.7 LBS | SYSTOLIC BLOOD PRESSURE: 118 MMHG | HEART RATE: 79 BPM | RESPIRATION RATE: 19 BRPM | HEIGHT: 67 IN

## 2019-10-09 DIAGNOSIS — E78.1 HYPERTRIGLYCERIDEMIA: ICD-10-CM

## 2019-10-09 DIAGNOSIS — Z13.1 DIABETES MELLITUS SCREENING: ICD-10-CM

## 2019-10-09 DIAGNOSIS — E03.9 HYPOTHYROIDISM, UNSPECIFIED TYPE: ICD-10-CM

## 2019-10-09 DIAGNOSIS — E53.8 VITAMIN B12 DEFICIENCY: Primary | ICD-10-CM

## 2019-10-09 RX ORDER — FENOFIBRATE 48 MG/1
48 TABLET, COATED ORAL DAILY
Qty: 90 TAB | Refills: 1 | Status: SHIPPED | OUTPATIENT
Start: 2019-10-09 | End: 2020-04-08

## 2019-10-09 RX ORDER — METFORMIN HYDROCHLORIDE 500 MG/1
TABLET, FILM COATED, EXTENDED RELEASE ORAL
COMMUNITY
End: 2020-02-25 | Stop reason: SDUPTHER

## 2019-10-09 RX ORDER — DICYCLOMINE HYDROCHLORIDE 10 MG/1
CAPSULE ORAL
COMMUNITY

## 2019-10-09 RX ORDER — LEVOTHYROXINE SODIUM 25 UG/1
25 TABLET ORAL
Qty: 90 TAB | Refills: 1 | Status: SHIPPED | OUTPATIENT
Start: 2019-10-09 | End: 2020-01-07 | Stop reason: SDUPTHER

## 2019-10-09 RX ORDER — LEVONORGESTREL AND ETHINYL ESTRADIOL 0.1-0.02MG
KIT ORAL
COMMUNITY
End: 2019-12-04 | Stop reason: SDUPTHER

## 2019-10-09 NOTE — PROGRESS NOTES
Chief Complaint   Patient presents with    Follow-up     3 month/ Room 7    Weight Management         HPI:  The patient is a 27 y.o. female who presents today for a follow up appointment. No hospital, ER or specialist visits since last primary care visit except as noted below. PCOS/GYN:  -started on Metformin, has tolerated this well. She is taking both at dinner time without issue.  -saw GYN for Pap on 4/1/2019: normal pap at that time  -Labs done: A1C: 5.5, CMP WNL, Testosterone WNL, CBC WNL    Abdominal Pain:  -present x approximately 1 year but unsure, in grad school FT, working FT, and taking care of her kids  -had painful intercourse, saw OB, had negative pelvic U/S, advised to see PCP for GI referral  -GI started dicyclomine for possible IBS. Labs: Your lab results have been reviewed and are as follows:    Urine Test:  Your urine analysis is normal.    Cholesterol Panel: Total Cholesterol is 132 (goal <200). Triglycerides are 295.  (goal <150). These are still high.  We will start Tricor today, I have escribed this to your pharmacy. Slime Nash will need to recheck this in 2-3 months. Your HDL or \"good\" cholesterol is 32. (goal >40). Your LDL or \"bad\" cholesterol is 41.  (goal <100). Thyroid:  Your thyroid function is abnormal.  We will start 25 mcg of levothyroxine at this time.  I have escribed this to your pharmacy on file. Slime Nash will need to recheck this in 1-2 months. Vitamin D is normal. Try to get 10-15 minutes of sunlight daily without burning and try to eat foods such as low fat dairy which is Vitamin D fortified. Your vitamin B12 is normal but on the low end of normal, we should consider Vitamin B12 injections at your next visit. Your Iron levels are normal.  Your Ferritin is normal.    No visits with results within 3 Month(s) from this visit.    Latest known visit with results is:   Orders Only on 05/07/2019   Component Date Value Ref Range Status    Specific Gravity 05/29/2019 1.016  1.005 - 1.030 Final    pH (UA) 05/29/2019 7.5  5.0 - 7.5 Final    Color 05/29/2019 Yellow  Yellow Final    Appearance 05/29/2019 Clear  Clear Final    Leukocyte Esterase 05/29/2019 Negative  Negative Final    Protein 05/29/2019 Negative  Negative/Trace Final    Glucose 05/29/2019 Negative  Negative Final    Ketone 05/29/2019 Negative  Negative Final    Blood 05/29/2019 Negative  Negative Final    Bilirubin 05/29/2019 Negative  Negative Final    Urobilinogen 05/29/2019 0.2  0.2 - 1.0 mg/dL Final    Nitrites 05/29/2019 Negative  Negative Final    Microscopic Examination 05/29/2019 Comment   Final    Microscopic not indicated and not performed.  Cholesterol, total 05/29/2019 132  100 - 199 mg/dL Final    Triglyceride 05/29/2019 295* 0 - 149 mg/dL Final    HDL Cholesterol 05/29/2019 32* >39 mg/dL Final    VLDL, calculated 05/29/2019 59* 5 - 40 mg/dL Final    LDL, calculated 05/29/2019 41  0 - 99 mg/dL Final    TSH 05/29/2019 4.140  0.450 - 4.500 uIU/mL Final    VITAMIN D, 25-HYDROXY 05/29/2019 37.6  30.0 - 100.0 ng/mL Final    Comment: Vitamin D deficiency has been defined by the [address] practice guideline as a  level of serum 25-OH vitamin D less than 20 ng/mL (1,2). The Endocrine Society went on to further define vitamin D  insufficiency as a level between 21 and 29 ng/mL (2). 1. IOM ([place] of [employer]). 2010. Dietary reference     intakes for calcium and D. [facility]: The     Quantitative Medicine. 2. Elizabeth MF, [place] NC, Mamadou CARVER, et al.     Evaluation, treatment, and prevention of vitamin D     deficiency: an Endocrine Society clinical practice     guideline. JCEM. 2011 Jul; 96(7):1911-30.       Vitamin B12 05/29/2019 442  232 - 1,245 pg/mL Final    TIBC 05/29/2019 338  250 - 450 ug/dL Final    UIBC 05/29/2019 271  131 - 425 ug/dL Final    Iron 05/29/2019 67  27 - 159 ug/dL Final    Iron % saturation 2019 20  15 - 55 % Final    Ferritin 2019 58  15 - 150 ng/mL Final    INTERPRETATION 2019 Note   Final    Supplemental report is available. Review of Systems  A comprehensive review of systems was negative except for that written in the HPI. Patient Active Problem List   Diagnosis Code    Allergic rhinitis J30.9    History of hypothyroidism Z86.39    Iron deficiency anemia D50.9    Obesity, Class III, BMI 40-49.9 (morbid obesity) ([facility] [address] E66.01    Dyslipidemia E78.5    Vitamin D deficiency E55.9    Hypothyroidism E03.9    Family history of melanoma Z80.8    PCOS (polycystic ovarian syndrome) E28.2    Irregular periods N92.6    Pain in pelvis R10.2    Pain of upper abdomen R10.10    Constipation K59.00    Hypertriglyceridemia E78.1       Past Medical History:   Diagnosis Date    Abnormal Pap smear 08    Abnormal Pap smear of cervix     saw OBGYN    Allergy, unspecified not elsewhere classified     Childhood asthma childhood    may need alb with URI    DUB (dysfunctional uterine bleeding) , recurred ~     Dyslipidemia 2015    Family history of melanoma     Hand pain 2013    Bilaterally. 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  Hypothyroid     Iron deficiency anemia 2013    Mononucleosis elementary, middle school, high school    PCOS (polycystic ovarian syndrome) 2017    PCOS (polycystic ovarian syndrome) 2017    Vertigo 2017    Prn meclizine.  Trigger sinus infection    Vitamin D deficiency 2015       Past Surgical History:   Procedure Laterality Date    HX  SECTION  2009    HX TYMPANOSTOMY Bilateral infant    INSERT PICC LINE Left     d/t abscess after        Social History     Tobacco Use    Smoking status: Former Smoker     Packs/day: 0.75     Years: 3.00     Pack years: 2.25     Types: Cigarettes     Last attempt to quit: 2005     Years since quittin.7    Smokeless tobacco: Never Used   Substance Use Topics    Alcohol use: Yes     Alcohol/week: 0.8 standard drinks     Types: 1 Glasses of wine per week     Comment: rare    Drug use: No       Family History   Problem Relation Age of Onset    Hypertension Father     Stroke Father         TIA    Cancer Father         spinal    High Cholesterol Mother     Other Mother         prediabetes    Heart Disease Mother         irregular heartbeat    Endometriosis Mother     Cancer Maternal Grandmother         lung     Cancer Paternal Grandmother         lymphoma/ brain     Diabetes Paternal Grandmother     Heart Disease Paternal Grandmother     Heart Disease Maternal Grandfather     Cancer Maternal Grandfather         skin- melanoma    Hypertension Brother     Depression Brother     No Known Problems Son        Outpatient Medications Marked as Taking for the 10/9/19 encounter (Office Visit) with Gloria Grady NP   Medication Sig Dispense Refill    levonorgestrel-ethinyl estradiol (LARISSIA) 0.1-20 mg-mcg tab Larissia 0.1 mg-20 mcg tablet   TAKE 1 TABLET BY MOUTH EVERY DAY      metFORMIN (GLUMETZA ER) 500 mg TG24 24 hour tablet metformin  mg tablet,extended release 24 hr   2 tablets every evening      MULTIVITAMIN PO multivitamin      dicyclomine (BENTYL) 10 mg capsule dicyclomine 10 mg capsule   Take 1 capsule twice a day by oral route.  aspirin/acetaminophen/caffeine (HEADACHE RELIEF, ASA-ACET-CAF, PO) Take  by mouth.  levothyroxine (SYNTHROID) 25 mcg tablet Take 1 Tab by mouth Daily (before breakfast). 90 Tab 1    fenofibrate nanocrystallized (TRICOR) 48 mg tablet Take 1 Tab by mouth daily. 90 Tab 1    cholecalciferol (VITAMIN D3) 1,000 unit cap Vitamin D3      senna (SENNA LAX) 8.6 mg tablet Senna Lax      bran-gum-fib-dana-psyl-kelp-pec (FIBER 6) 1,000 mg tab fiber      fluticasone (FLONASE) 50 mcg/actuation nasal spray 2 Sprays by Both Nostrils route daily.       loratadine 10 mg cap Take by mouth.  albuterol (PROVENTIL HFA, VENTOLIN HFA, PROAIR HFA) 90 mcg/actuation inhaler Take 2 Puffs by inhalation every four (4) hours as needed for Wheezing or Shortness of Breath (cough). 1 Inhaler 0    MV-MN/IRON/FOLIC ACID/HERB 793 (VITAMIN D3 COMPLETE PO) Take  by mouth. Current Outpatient Medications on File Prior to Visit   Medication Sig Dispense Refill    levonorgestrel-ethinyl estradiol (LARISSIA) 0.1-20 mg-mcg tab Larissia 0.1 mg-20 mcg tablet   TAKE 1 TABLET BY MOUTH EVERY DAY      metFORMIN (GLUMETZA ER) 500 mg TG24 24 hour tablet metformin  mg tablet,extended release 24 hr   2 tablets every evening      MULTIVITAMIN PO multivitamin      dicyclomine (BENTYL) 10 mg capsule dicyclomine 10 mg capsule   Take 1 capsule twice a day by oral route.  aspirin/acetaminophen/caffeine (HEADACHE RELIEF, ASA-ACET-CAF, PO) Take  by mouth.  cholecalciferol (VITAMIN D3) 1,000 unit cap Vitamin D3      senna (SENNA LAX) 8.6 mg tablet Senna Lax      bran-gum-fib-dana-psyl-kelp-pec (FIBER 6) 1,000 mg tab fiber      fluticasone (FLONASE) 50 mcg/actuation nasal spray 2 Sprays by Both Nostrils route daily.  loratadine 10 mg cap Take  by mouth.  albuterol (PROVENTIL HFA, VENTOLIN HFA, PROAIR HFA) 90 mcg/actuation inhaler Take 2 Puffs by inhalation every four (4) hours as needed for Wheezing or Shortness of Breath (cough). 1 Inhaler 0    MV-MN/IRON/FOLIC ACID/HERB 384 (VITAMIN D3 COMPLETE PO) Take  by mouth.  [DISCONTINUED] fenofibrate nanocrystallized (TRICOR) 48 mg tablet Take 1 Tab by mouth daily. 30 Tab 2    [DISCONTINUED] levothyroxine (SYNTHROID) 25 mcg tablet Take 1 Tab by mouth Daily (before breakfast). 30 Tab 2    docusate sodium (STOOL SOFTENER) 100 mg capsule Stool Softener      [DISCONTINUED] levonorgestrel-ethinyl estradiol (AVIANE) 0.1-20 mg-mcg tab Aviane 0.1 mg-20 mcg tablet   Take 1 tablet every day by oral route.       [DISCONTINUED] metFORMIN (GLUCOPHAGE) 500 mg tablet Take 1 Tab by mouth two (2) times daily (with meals). 180 Tab 3    ascorbic acid, vitamin C, (VITAMIN C) 250 mg tablet Take  by mouth. No current facility-administered medications on file prior to visit. Allergies   Allergen Reactions    Morphine Other (comments) and Seizures     Tremors       PE:  Visit Vitals  /85 (BP 1 Location: Left arm, BP Patient Position: Sitting)   Pulse 79   Temp 98.7 °F (37.1 °C) (Oral)   Resp 19   Ht 5' 7\" (1.702 m)   Wt 282 lb 11.2 oz (128.2 kg)   LMP 10/01/2019 (Exact Date)   SpO2 98%   BMI 44.28 kg/m²       Gen: alert, oriented, no acute distress  Head: normocephalic, atraumatic  Ears: external auditory canals clear, TMs without erythema or effusion  Eyes: pupils equal round reactive to light, sclera clear, conjunctiva clear  Oral: moist mucus membranes, no oral lesions, no pharyngeal inflammation or exudate  Neck: symmetric normal sized thyroid, no carotid bruits, no jugular vein distention  Resp: no increase work of breathing, lungs clear to ausculation bilaterally, no wheezing, rales or rhonchi  CV: S1, S2 normal.  No murmurs, rubs, or gallops. Abd: soft, not tender, not distended. No hepatosplenomegaly. Normal bowel sounds. No hernias. No abdominal or renal bruits. Neuro: cranial nerves intact, normal strength and movement in all extremities, reflexes and sensation intact and symmetric. Skin: no lesion or rash  Extremities: no cyanosis or edema    No results found for this visit on 10/09/19. Assessment/Plan:    ICD-10-CM ICD-9-CM    1. Vitamin B12 deficiency E53.8 266.2 VITAMIN B12 & FOLATE   2. Hypothyroidism, unspecified type E03.9 244.9 levothyroxine (SYNTHROID) 25 mcg tablet      THYROID PEROXIDASE (TPO) AB      TSH 3RD GENERATION   3.  Hypertriglyceridemia E78.1 272.1 fenofibrate nanocrystallized (TRICOR) 48 mg tablet      LIPID PANEL   4. Diabetes mellitus screening I78.3 Y25.9 METABOLIC PANEL, COMPREHENSIVE      HEMOGLOBIN A1C WITH EAG     Diagnoses and all orders for this visit:    1. Vitamin B12 deficiency  -     VITAMIN B12 & FOLATE; Future    2. Hypothyroidism, unspecified type  -     levothyroxine (SYNTHROID) 25 mcg tablet; Take 1 Tab by mouth Daily (before breakfast). -     THYROID PEROXIDASE (TPO) AB; Future  -     TSH 3RD GENERATION; Future    3. Hypertriglyceridemia  -     fenofibrate nanocrystallized (TRICOR) 48 mg tablet; Take 1 Tab by mouth daily.  -     LIPID PANEL; Future    4. Diabetes mellitus screening  -     METABOLIC PANEL, COMPREHENSIVE; Future  -     HEMOGLOBIN A1C WITH EAG; Future          lab results and schedule of future lab studies reviewed with patient  reviewed diet, exercise and weight control  reviewed medications and side effects in detail    lose weight, increase physical activity, call if any problems    Health Maintenance reviewed - reviewed, UTD. Recommended healthy diet low in carbohydrates, fats, sodium and cholesterol. Recommended regular cardiovascular exercise 3-6 times per week for 30-60 minutes daily. Chart is reviewed and updated today in the office. Records requested for other providers patient has seen and is currently seeing. Patient was offered a choice/choices in the treatment plan today. Patient expresses understanding of the plan and agrees with recommendations. Verbal and written instructions (see AVS) provided. See patient instructions for more. Patient expresses understanding of diagnosis and treatment plan.

## 2019-10-09 NOTE — PATIENT INSTRUCTIONS
High Cholesterol: Care Instructions  Your Care Instructions    Cholesterol is a type of fat in your blood. It is needed for many body functions, such as making new cells. Cholesterol is made by your body. It also comes from food you eat. High cholesterol means that you have too much of the fat in your blood. This raises your risk of a heart attack and stroke. LDL and HDL are part of your total cholesterol. LDL is the \"bad\" cholesterol. High LDL can raise your risk for heart disease, heart attack, and stroke. HDL is the \"good\" cholesterol. It helps clear bad cholesterol from the body. High HDL is linked with a lower risk of heart disease, heart attack, and stroke. Your cholesterol levels help your doctor find out your risk for having a heart attack or stroke. You and your doctor can talk about whether you need to lower your risk and what treatment is best for you. A heart-healthy lifestyle along with medicines can help lower your cholesterol and your risk. The way you choose to lower your risk will depend on how high your risk is for heart attack and stroke. It will also depend on how you feel about taking medicines. Follow-up care is a key part of your treatment and safety. Be sure to make and go to all appointments, and call your doctor if you are having problems. It's also a good idea to know your test results and keep a list of the medicines you take. How can you care for yourself at home? · Eat a variety of foods every day. Good choices include fruits, vegetables, whole grains (like oatmeal), dried beans and peas, nuts and seeds, soy products (like tofu), and fat-free or low-fat dairy products. · Replace butter, margarine, and hydrogenated or partially hydrogenated oils with olive and canola oils. (Canola oil margarine without trans fat is fine.)  · Replace red meat with fish, poultry, and soy protein (like tofu). · Limit processed and packaged foods like chips, crackers, and cookies.   · Bake, broil, or steam foods. Don't alvarez them. · Be physically active. Get at least 30 minutes of exercise on most days of the week. Walking is a good choice. You also may want to do other activities, such as running, swimming, cycling, or playing tennis or team sports. · Stay at a healthy weight or lose weight by making the changes in eating and physical activity listed above. Losing just a small amount of weight, even 5 to 10 pounds, can reduce your risk for having a heart attack or stroke. · Do not smoke. When should you call for help? Watch closely for changes in your health, and be sure to contact your doctor if:    · You need help making lifestyle changes.     · You have questions about your medicine. Where can you learn more? Go to http://amarjitBusbudhipolito.info/. Enter F659 in the search box to learn more about \"High Cholesterol: Care Instructions. \"  Current as of: April 9, 2019  Content Version: 12.2  © 0212-5013 [facility]. Care instructions adapted under license by [employer] (which disclaims liability or warranty for this information). If you have questions about a medical condition or this instruction, always ask your healthcare professional. [address] any warranty or liability for your use of this information. Hypothyroidism: Care Instructions  Your Care Instructions    You have hypothyroidism, which means that your body is not making enough thyroid hormone. This hormone helps your body use energy. If your thyroid level is low, you may feel tired, be constipated, have an increase in your blood pressure, or have dry skin or memory problems. You may also get cold easily, even when it is warm. Women with low thyroid levels may have heavy menstrual periods. A blood test to find your thyroid-stimulating hormone (TSH) level is used to check for hypothyroidism. A high TSH level may mean that you have low thyroid.  When your body is not making enough thyroid hormone, TSH levels rise in an effort to make the body produce more. The treatment for hypothyroidism is to take thyroid hormone pills. You should start to feel better in 1 to 2 weeks. But it can take several months to see changes in the TSH level. You will need regular visits with your doctor to make sure you have the right dose of medicine. Most people need treatment for the rest of their lives. You will need to see your doctor regularly to have blood tests and to make sure you are doing well. Follow-up care is a key part of your treatment and safety. Be sure to make and go to all appointments, and call your doctor if you are having problems. It's also a good idea to know your test results and keep a list of the medicines you take. How can you care for yourself at home? · Take your thyroid hormone medicine exactly as prescribed. Call your doctor if you think you are having a problem with your medicine. Most people do not have side effects if they take the right amount of medicine regularly. ? Take the medicine 30 minutes before breakfast, and do not take it with calcium, vitamins, or iron. ? Do not take extra doses of your thyroid medicine. It will not help you get better any faster, and it may cause side effects. ? If you forget to take a dose, do NOT take a double dose of medicine. Take your usual dose the next day. · Tell your doctor about all prescription, herbal, or over-the-counter products you take. · Take care of yourself. Eat a healthy diet, get enough sleep, and get regular exercise. When should you call for help? Call 911 anytime you think you may need emergency care.  For example, call if:    · You passed out (lost consciousness).     · You have severe trouble breathing.     · You have a very slow heartbeat (less than 60 beats a minute).     · You have a low body temperature (95°F or below).    Call your doctor now or seek immediate medical care if:    · You feel tired, sluggish, or weak.     · You have trouble remembering things or concentrating.     · You do not begin to feel better 2 weeks after starting your medicine.    Watch closely for changes in your health, and be sure to contact your doctor if you have any problems. Where can you learn more? Go to http://amarjit-hipolito.info/. Enter D283 in the search box to learn more about \"Hypothyroidism: Care Instructions. \"  Current as of: November 6, 2018  Content Version: 12.2  © 7446-4913 [employer]. Care instructions adapted under license by [employer] (which disclaims liability or warranty for this information). If you have questions about a medical condition or this instruction, always ask your healthcare professional. [address] any warranty or liability for your use of this information.

## 2019-10-09 NOTE — PROGRESS NOTES
Johnnie   Identified pt with two pt identifiers(name and ). Chief Complaint   Patient presents with    Follow-up     3 month/ Room 7    Weight Management       1. Have you been to the ER, urgent care clinic since your last visit?n   Hospitalized since your last visit? n     2. Have you seen or consulted any other health care providers outside of the [address] [place] [address] since your last visit? Include any pap smears or colon screening. n       Advance Care Planning    In the event something were to happen to you and you were unable to speak on your behalf, do you have an Advance Directive/ Living Will in place stating your wishes? NO    If yes, do we have a copy on file NO    If no, would you like information NO    Medication reconciliation up to date and corrected with patient at this time. Today's provider has been notified of reason for visit, vitals and flowsheets obtained on patients. Reviewed record in preparation for visit, huddled with provider and have obtained necessary documentation.       Health Maintenance Due   Topic    Influenza Age 5 to Adult        Wt Readings from Last 3 Encounters:   10/09/19 282 lb 11.2 oz (128.2 kg)   19 283 lb 12.8 oz (128.7 kg)   17 286 lb (129.7 kg)     Temp Readings from Last 3 Encounters:   10/09/19 98.7 °F (37.1 °C) (Oral)   19 98.3 °F (36.8 °C) (Oral)   17 97.6 °F (36.4 °C) (Oral)     BP Readings from Last 3 Encounters:   10/09/19 118/85   19 118/76   17 125/77     Pulse Readings from Last 3 Encounters:   10/09/19 79   19 94   17 90     Vitals:    10/09/19 1652   BP: 118/85   Pulse: 79   Resp: 19   Temp: 98.7 °F (37.1 °C)   TempSrc: Oral   SpO2: 98%   Weight: 282 lb 11.2 oz (128.2 kg)   Height: 5' 7\" (1.702 m)   PainSc:   2   PainLoc: Head   LMP: 10/01/2019         Learning Assessment:  :     Learning Assessment 10/9/2019 2014 2014   PRIMARY LEARNER Patient Patient Patient   HIGHEST LEVEL OF EDUCATION - PRIMARY LEARNER  - 4 YEARS OF COLLEGE 4 YEARS OF COLLEGE   BARRIERS PRIMARY LEARNER - NONE NONE   CO-LEARNER CAREGIVER - - No   BARRIERS CO-LEARNER - - NONE   PRIMARY LANGUAGE ENGLISH ENGLISH ENGLISH   PRIMARY LANGUAGE CO-LEARNER - - OTHER (COMMENTS)    NEED - - No   LEARNER PREFERENCE PRIMARY LISTENING OTHER (COMMENT) OTHER (COMMENT)   LEARNING SPECIAL TOPICS - no no   ANSWERED BY patient patient patient   RELATIONSHIP SELF - SELF   ASSESSMENT COMMENT - - no       Depression Screening:  :     3 most recent PHQ Screens 10/9/2019   Little interest or pleasure in doing things Not at all   Feeling down, depressed, irritable, or hopeless Not at all   Total Score PHQ 2 0       No flowsheet data found. Fall Risk Assessment:  :     Fall Risk Assessment, last 12 mths 9/29/2017   Able to walk? Yes   Fall in past 12 months? No       Abuse Screening:  :     Abuse Screening Questionnaire 9/29/2017   Do you ever feel afraid of your partner? N   Are you in a relationship with someone who physically or mentally threatens you? N   Is it safe for you to go home?  Y       ADL Screening:  :     ADL Assessment 5/6/2019   Feeding yourself No Help Needed   Getting from bed to chair No Help Needed   Getting dressed No Help Needed   Bathing or showering No Help Needed   Walk across the room (includes cane/walker) No Help Needed   Using the telphone No Help Needed   Taking your medications No Help Needed   Preparing meals No Help Needed   Managing money (expenses/bills) No Help Needed   Moderately strenuous housework (laundry) No Help Needed   Shopping for personal items (toiletries/medicines) No Help Needed   Shopping for groceries No Help Needed   Driving No Help Needed   Climbing a flight of stairs No Help Needed   Getting to places beyond walking distances No Help Needed           BMI:  Weight Metrics 10/9/2019 10/9/2019 5/6/2019 12/18/2017 11/20/2017 10/23/2017 9/29/2017   Weight - 282 lb 11.2 oz 283 lb 12.8 oz 286 lb 286 lb 11.2 oz 286 lb 289 lb 14.4 oz   Waist Measure Inches 53 - - - - - -   Body Fat % 45.7 - - - - - -   BMI - 44.28 kg/m2 44.45 kg/m2 44.79 kg/m2 44.9 kg/m2 44.79 kg/m2 45.4 kg/m2           Medication reconciliation up to date and corrected with patient at this time.

## 2019-10-10 DIAGNOSIS — E53.8 VITAMIN B12 DEFICIENCY: ICD-10-CM

## 2019-10-10 DIAGNOSIS — E03.9 HYPOTHYROIDISM, UNSPECIFIED TYPE: ICD-10-CM

## 2019-10-10 DIAGNOSIS — Z13.1 DIABETES MELLITUS SCREENING: ICD-10-CM

## 2019-10-10 DIAGNOSIS — E78.1 HYPERTRIGLYCERIDEMIA: ICD-10-CM

## 2019-11-28 LAB
ALBUMIN SERPL-MCNC: 4.1 G/DL (ref 3.5–5.5)
ALBUMIN/GLOB SERPL: 1.5 {RATIO} (ref 1.2–2.2)
ALP SERPL-CCNC: 70 IU/L (ref 39–117)
ALT SERPL-CCNC: 22 IU/L (ref 0–32)
AST SERPL-CCNC: 18 IU/L (ref 0–40)
BILIRUB SERPL-MCNC: 0.3 MG/DL (ref 0–1.2)
BUN SERPL-MCNC: 12 MG/DL (ref 6–20)
BUN/CREAT SERPL: 14 (ref 9–23)
CALCIUM SERPL-MCNC: 9.6 MG/DL (ref 8.7–10.2)
CHLORIDE SERPL-SCNC: 101 MMOL/L (ref 96–106)
CHOLEST SERPL-MCNC: 169 MG/DL (ref 100–199)
CO2 SERPL-SCNC: 23 MMOL/L (ref 20–29)
CREAT SERPL-MCNC: 0.83 MG/DL (ref 0.57–1)
EST. AVERAGE GLUCOSE BLD GHB EST-MCNC: 105 MG/DL
FOLATE SERPL-MCNC: 19.4 NG/ML
GLOBULIN SER CALC-MCNC: 2.7 G/DL (ref 1.5–4.5)
GLUCOSE SERPL-MCNC: 98 MG/DL (ref 65–99)
HBA1C MFR BLD: 5.3 % (ref 4.8–5.6)
HDLC SERPL-MCNC: 38 MG/DL
INTERPRETATION, 910389: NORMAL
LDLC SERPL CALC-MCNC: 91 MG/DL (ref 0–99)
POTASSIUM SERPL-SCNC: 4.6 MMOL/L (ref 3.5–5.2)
PROT SERPL-MCNC: 6.8 G/DL (ref 6–8.5)
SODIUM SERPL-SCNC: 139 MMOL/L (ref 134–144)
THYROPEROXIDASE AB SERPL-ACNC: 106 IU/ML (ref 0–34)
TRIGL SERPL-MCNC: 198 MG/DL (ref 0–149)
TSH SERPL DL<=0.005 MIU/L-ACNC: 2.96 UIU/ML (ref 0.45–4.5)
VIT B12 SERPL-MCNC: 472 PG/ML (ref 232–1245)
VLDLC SERPL CALC-MCNC: 40 MG/DL (ref 5–40)

## 2019-12-04 ENCOUNTER — OFFICE VISIT (OUTPATIENT)
Dept: FAMILY MEDICINE CLINIC | Age: 34
End: 2019-12-04

## 2019-12-04 VITALS
TEMPERATURE: 97.9 F | SYSTOLIC BLOOD PRESSURE: 129 MMHG | RESPIRATION RATE: 18 BRPM | WEIGHT: 280.3 LBS | OXYGEN SATURATION: 98 % | DIASTOLIC BLOOD PRESSURE: 67 MMHG | HEIGHT: 66 IN | BODY MASS INDEX: 45.05 KG/M2 | HEART RATE: 81 BPM

## 2019-12-04 DIAGNOSIS — Z76.89 ENCOUNTER FOR WEIGHT MANAGEMENT: ICD-10-CM

## 2019-12-04 DIAGNOSIS — E28.2 PCOS (POLYCYSTIC OVARIAN SYNDROME): ICD-10-CM

## 2019-12-04 DIAGNOSIS — R63.4 WEIGHT LOSS: ICD-10-CM

## 2019-12-04 DIAGNOSIS — Z23 ENCOUNTER FOR IMMUNIZATION: ICD-10-CM

## 2019-12-04 DIAGNOSIS — E03.9 HYPOTHYROIDISM, UNSPECIFIED TYPE: Primary | ICD-10-CM

## 2019-12-04 DIAGNOSIS — Z30.40 ENCOUNTER FOR REFILL OF PRESCRIPTION FOR CONTRACEPTION: ICD-10-CM

## 2019-12-04 DIAGNOSIS — M54.50 ACUTE BILATERAL LOW BACK PAIN WITHOUT SCIATICA: ICD-10-CM

## 2019-12-04 DIAGNOSIS — E78.1 HYPERTRIGLYCERIDEMIA: ICD-10-CM

## 2019-12-04 DIAGNOSIS — E53.8 VITAMIN B12 DEFICIENCY: ICD-10-CM

## 2019-12-04 DIAGNOSIS — E66.01 OBESITY, CLASS III, BMI 40-49.9 (MORBID OBESITY) (HCC): ICD-10-CM

## 2019-12-04 RX ORDER — LANOLIN ALCOHOL/MO/W.PET/CERES
500 CREAM (GRAM) TOPICAL DAILY
Qty: 90 TAB | Refills: 0 | Status: SHIPPED | OUTPATIENT
Start: 2019-12-04

## 2019-12-04 RX ORDER — LEVONORGESTREL AND ETHINYL ESTRADIOL 0.1-0.02MG
1 KIT ORAL DAILY
Qty: 3 DOSE PACK | Refills: 3 | Status: SHIPPED | OUTPATIENT
Start: 2019-12-04 | End: 2020-05-03 | Stop reason: SDUPTHER

## 2019-12-04 RX ORDER — PHENTERMINE HYDROCHLORIDE 37.5 MG/1
37.5 TABLET ORAL
Qty: 30 TAB | Refills: 0 | Status: SHIPPED | OUTPATIENT
Start: 2019-12-04 | End: 2020-01-07 | Stop reason: SDUPTHER

## 2019-12-04 NOTE — PROGRESS NOTES
Chief Complaint   Patient presents with    Weight Management     1 month f/u    Thyroid Problem    Vitamin B12 Deficiency     HPI:  The patient is a 27 y.o. female who presents today for a follow up appointment. No hospital, ER or specialist visits since last primary care visit except as noted below. Weight Management:          The patient is a 27y.o. year old obese female who presents today for medical weight loss, her PCP is Gloria Grady NP. Pt's initial weight is 280.5 pounds with a BMI of 44.89 on 12/4/2019. Patient's highest weight was 289 pounds. Patient's goal weight is 200-215 pounds ideally, however she would be ok with 240-250 pounds. The patient's bariatric comorbidities include PCOS, hypothyroidism, XOL. The patient's reason for medical weight loss is improved health and mobility. The patient's quality of life goals are riding roller coasters. 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  She has previously tried the following medications:  Phentermine.   Weight Metrics 12/4/2019 12/4/2019 10/9/2019 10/9/2019 5/6/2019 12/18/2017 11/20/2017   Weight - 280 lb 4.8 oz - 282 lb 11.2 oz 283 lb 12.8 oz 286 lb 286 lb 11.2 oz   Neck Circ (inches) 16.5 - - - - - -   Waist Measure Inches 31 - 53 - - - -   Body Fat % 45.5 - 45.7 - - - -   BMI - 44.89 kg/m2 - 44.28 kg/m2 44.45 kg/m2 44.79 kg/m2 44.9 kg/m2     EKG done today: NSR    PCOS/GYN:  -started on Metformin, has tolerated this well since changed to extended release. She is taking both at dinner time without issue.  -saw GYN for Pap on 4/1/2019: normal pap at that time    LBP:  Patient reports when she gets up from lying on her stomach she has significant LBP. She reports that she cannot stand up straight right away but it quickly resolved spontaneously. Labs:  Reviewed with the patient in the office today, 12/4/2019: Your lab results have been reviewed and are as follows:    Cholesterol Panel: Improved! :)  Total Cholesterol is 169 (goal <200). Triglycerides are 198. (goal <150)  Your HDL or \"good\" cholesterol is 38. (goal >40). Your LDL or \"bad\" cholesterol is 91.  (goal <100). Thyroid:  Your thyroid function is normal, at the higher end of normal but improved. We will recheck this in 3 months. TPO AB Elevated    Your vitamin B12 is on the low end of normal.  Recommend Vitamin B12 supplementation to achieve Vitamin B12 level above 500. We will recheck in 3 months. CMP:  Fasting blood sugar is normal at 98, your hemoglobin A1C is 5.3. You do not have diabetes. Kidney function is normal.   Your electrolytes are normal.   Your liver function is normal.     Orders Only on 10/10/2019   Component Date Value Ref Range Status    Cholesterol, total 11/27/2019 169  100 - 199 mg/dL Final    Triglyceride 11/27/2019 198* 0 - 149 mg/dL Final    HDL Cholesterol 11/27/2019 38* >39 mg/dL Final    VLDL, calculated 11/27/2019 40  5 - 40 mg/dL Final    LDL, calculated 11/27/2019 91  0 - 99 mg/dL Final    Thyroid peroxidase Ab 11/27/2019 106* 0 - 34 IU/mL Final    TSH 11/27/2019 2.960  0.450 - 4.500 uIU/mL Final    Vitamin B12 11/27/2019 472  232 - 1,245 pg/mL Final    Folate 11/27/2019 19.4  >3.0 ng/mL Final    Comment: A serum folate concentration of less than 3.1 ng/mL is  considered to represent clinical deficiency.  Glucose 11/27/2019 98  65 - 99 mg/dL Final    BUN 11/27/2019 12  6 - 20 mg/dL Final    Creatinine 11/27/2019 0.83  0.57 - 1.00 mg/dL Final    GFR est non-AA 11/27/2019 92  >59 mL/min/1.73 Final    GFR est AA 11/27/2019 106  >59 mL/min/1.73 Final    BUN/Creatinine ratio 11/27/2019 14  9 - 23 Final    Sodium 11/27/2019 139  134 - 144 mmol/L Final    Potassium 11/27/2019 4.6  3.5 - 5.2 mmol/L Final    Chloride 11/27/2019 101  96 - 106 mmol/L Final    CO2 11/27/2019 23  20 - 29 mmol/L Final    Calcium 11/27/2019 9.6  8.7 - 10.2 mg/dL Final    Protein, total 11/27/2019 6.8  6.0 - 8.5 g/dL Final    Albumin 11/27/2019 4.1  3.5 - 5.5 g/dL Final    GLOBULIN, TOTAL 11/27/2019 2.7  1.5 - 4.5 g/dL Final    A-G Ratio 11/27/2019 1.5  1.2 - 2.2 Final    Bilirubin, total 11/27/2019 0.3  0.0 - 1.2 mg/dL Final    Alk. phosphatase 11/27/2019 70  39 - 117 IU/L Final    AST (SGOT) 11/27/2019 18  0 - 40 IU/L Final    ALT (SGPT) 11/27/2019 22  0 - 32 IU/L Final    Hemoglobin A1c 11/27/2019 5.3  4.8 - 5.6 % Final    Comment:          Prediabetes: 5.7 - 6.4           Diabetes: >6.4           Glycemic control for adults with diabetes: <7.0      Estimated average glucose 11/27/2019 105  mg/dL Final    INTERPRETATION 11/27/2019 Note   Final    Supplemental report is available. Review of Systems  A comprehensive review of systems was negative except for that written in the HPI.     Patient Active Problem List   Diagnosis Code    Allergic rhinitis J30.9    History of hypothyroidism Z86.39    Iron deficiency anemia D50.9    Obesity, Class III, BMI 40-49.9 (morbid obesity) ([facility] E66.01    Dyslipidemia E78.5    Vitamin D deficiency E55.9    Hypothyroidism E03.9    Family history of melanoma Z80.8    PCOS (polycystic ovarian syndrome) E28.2    Irregular periods N92.6    Pain in pelvis R10.2    Pain of upper abdomen R10.10    Constipation K59.00    Hypertriglyceridemia E78.1    Vitamin B12 deficiency E53.8  Acute bilateral low back pain without sciatica M54.5    Encounter for weight management Z76.89    Weight loss R63.4       Past Medical History:   Diagnosis Date    Abnormal Pap smear 08    Abnormal Pap smear of cervix     saw OBGYN    Allergy, unspecified not elsewhere classified     Childhood asthma childhood    may need alb with URI    DUB (dysfunctional uterine bleeding) , recurred ~     Dyslipidemia 2015    Family history of melanoma     Hand pain 2013    Bilaterally. 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  Hypothyroid     Iron deficiency anemia 2013    Mononucleosis elementary, middle school, high school    PCOS (polycystic ovarian syndrome) 2017    PCOS (polycystic ovarian syndrome) 2017    Vertigo 2017    Prn meclizine. Trigger sinus infection    Vitamin D deficiency 2015       Past Surgical History:   Procedure Laterality Date    HX  SECTION  2009    HX TYMPANOSTOMY Bilateral infant    INSERT PICC LINE Left     d/t abscess after        Social History     Tobacco Use    Smoking status: Former Smoker     Packs/day: 0.75     Years: 3.00     Pack years: 2.25     Types: Cigarettes     Last attempt to quit: 2005     Years since quittin.9    Smokeless tobacco: Never Used   Substance Use Topics    Alcohol use:  Yes     Alcohol/week: 0.8 standard drinks     Types: 1 Glasses of wine per week     Comment: rare    Drug use: No       Family History   Problem Relation Age of Onset    Hypertension Father     Stroke Father         TIA    Cancer Father         spinal    High Cholesterol Mother     Other Mother         prediabetes    Heart Disease Mother         irregular heartbeat    Endometriosis Mother     Cancer Maternal Grandmother         lung     Cancer Paternal Grandmother         lymphoma/ brain     Diabetes Paternal Grandmother     Heart Disease Paternal Grandmother     Heart Disease Maternal Grandfather     Cancer Maternal Grandfather         skin- melanoma    Hypertension Brother     Depression Brother     No Known Problems Son        Outpatient Medications Marked as Taking for the 12/4/19 encounter (Office Visit) with Murray Middleton NP   Medication Sig Dispense Refill    cyanocobalamin (VITAMIN B12) 500 mcg tablet Take 1 Tab by mouth daily. 90 Tab 0    phentermine (ADIPEX-P) 37.5 mg tablet Take 1 Tab by mouth every morning. Max Daily Amount: 37.5 mg. 30 Tab 0    levonorgestrel-ethinyl estradiol (LARISSIA) 0.1-20 mg-mcg tab Take 1 Tab by mouth daily. 3 Dose Pack 3    metFORMIN (GLUMETZA ER) 500 mg TG24 24 hour tablet metformin  mg tablet,extended release 24 hr   2 tablets every evening      MULTIVITAMIN PO Take 1 Tab by mouth two (2) times a day.  dicyclomine (BENTYL) 10 mg capsule dicyclomine 10 mg capsule   Take 1 capsule twice a day by oral route.  aspirin/acetaminophen/caffeine (HEADACHE RELIEF, ASA-ACET-CAF, PO) Take  by mouth.  levothyroxine (SYNTHROID) 25 mcg tablet Take 1 Tab by mouth Daily (before breakfast). 90 Tab 1    fenofibrate nanocrystallized (TRICOR) 48 mg tablet Take 1 Tab by mouth daily. 90 Tab 1    cholecalciferol (VITAMIN D3) 1,000 unit cap Vitamin D3      senna (SENNA LAX) 8.6 mg tablet Senna Lax      docusate sodium (STOOL SOFTENER) 100 mg capsule Stool Softener      bran-gum-fib-dana-psyl-kelp-pec (FIBER 6) 1,000 mg tab fiber      fluticasone (FLONASE) 50 mcg/actuation nasal spray 2 Sprays by Both Nostrils route daily.  loratadine 10 mg cap Take  by mouth.  albuterol (PROVENTIL HFA, VENTOLIN HFA, PROAIR HFA) 90 mcg/actuation inhaler Take 2 Puffs by inhalation every four (4) hours as needed for Wheezing or Shortness of Breath (cough). 1 Inhaler 0    ascorbic acid, vitamin C, (VITAMIN C) 250 mg tablet Take  by mouth.          Current Outpatient Medications on File Prior to Visit   Medication Sig Dispense Refill    metFORMIN [facility]) 500 mg TG24 24 hour tablet metformin  mg tablet,extended release 24 hr   2 tablets every evening      MULTIVITAMIN PO Take 1 Tab by mouth two (2) times a day.  dicyclomine (BENTYL) 10 mg capsule dicyclomine 10 mg capsule   Take 1 capsule twice a day by oral route.  aspirin/acetaminophen/caffeine (HEADACHE RELIEF, ASA-ACET-CAF, PO) Take  by mouth.  levothyroxine (SYNTHROID) 25 mcg tablet Take 1 Tab by mouth Daily (before breakfast). 90 Tab 1    fenofibrate nanocrystallized (TRICOR) 48 mg tablet Take 1 Tab by mouth daily. 90 Tab 1    cholecalciferol (VITAMIN D3) 1,000 unit cap Vitamin D3      senna (SENNA LAX) 8.6 mg tablet Senna Lax      docusate sodium (STOOL SOFTENER) 100 mg capsule Stool Softener      bran-gum-fib-dana-psyl-kelp-pec (FIBER 6) 1,000 mg tab fiber      fluticasone (FLONASE) 50 mcg/actuation nasal spray 2 Sprays by Both Nostrils route daily.  loratadine 10 mg cap Take  by mouth.  albuterol (PROVENTIL HFA, VENTOLIN HFA, PROAIR HFA) 90 mcg/actuation inhaler Take 2 Puffs by inhalation every four (4) hours as needed for Wheezing or Shortness of Breath (cough). 1 Inhaler 0    ascorbic acid, vitamin C, (VITAMIN C) 250 mg tablet Take  by mouth.  [DISCONTINUED] levonorgestrel-ethinyl estradiol (LARISSIA) 0.1-20 mg-mcg tab [place]issia 0.1 mg-20 mcg tablet   TAKE 1 TABLET BY MOUTH EVERY DAY      [DISCONTINUED] MV-MN/IRON/FOLIC ACID/HERB 202 (VITAMIN D3 COMPLETE PO) Take  by mouth. No current facility-administered medications on file prior to visit.         Allergies   Allergen Reactions    Morphine Other (comments) and Seizures     Tremors       PE:  Visit Vitals  /67 (BP 1 Location: Right arm, BP Patient Position: Sitting)   Pulse 81   Temp 97.9 °F (36.6 °C) (Oral)   Resp 18   Ht 5' 6.26\" (1.683 m)   Wt 280 lb 4.8 oz (127.1 kg)   LMP 11/20/2019   SpO2 98%   BMI 44.89 kg/m²       Gen: alert, oriented, no acute distress  Head: normocephalic, atraumatic  Ears: external auditory canals clear, TMs without erythema or effusion  Eyes: pupils equal round reactive to light, sclera clear, conjunctiva clear  Oral: moist mucus membranes, no oral lesions, no pharyngeal inflammation or exudate  Neck: symmetric normal sized thyroid, no carotid bruits, no jugular vein distention  Resp: no increase work of breathing, lungs clear to ausculation bilaterally, no wheezing, rales or rhonchi  CV: S1, S2 normal.  No murmurs, rubs, or gallops. Abd: soft, not tender, not distended. No hepatosplenomegaly. Normal bowel sounds. No hernias. No abdominal or renal bruits. Neuro: cranial nerves intact, normal strength and movement in all extremities, reflexes and sensation intact and symmetric. Skin: no lesion or rash  Extremities: no cyanosis or edema    No results found for this visit on 12/04/19. Assessment/Plan:    ICD-10-CM ICD-9-CM    1. Hypothyroidism, unspecified type E03.9 244.9 AMB POC EKG ROUTINE W/ 12 LEADS, INTER & REP   2. Hypertriglyceridemia E78.1 272.1 AMB POC EKG ROUTINE W/ 12 LEADS, INTER & REP   3. PCOS (polycystic ovarian syndrome) E28.2 256.4    4. Obesity, Class III, BMI 40-49.9 (morbid obesity) (HCC) E66.01 278.01 AMB POC EKG ROUTINE W/ 12 LEADS, INTER & REP      phentermine (ADIPEX-P) 37.5 mg tablet   5. Encounter for immunization Z23 V03.89 CANCELED: INFLUENZA VIRUS VAC QUAD,SPLIT,PRESV FREE SYRINGE IM      CANCELED: PA IMMUNIZ ADMIN,1 SINGLE/COMB VAC/TOXOID   6. Vitamin B12 deficiency E53.8 266.2 cyanocobalamin (VITAMIN B12) 500 mcg tablet   7. Acute bilateral low back pain without sciatica M54.5 724.2 XR SPINE LUMB COMP W BEND     338.19    8. Encounter for weight management Z76.89 V65.49 phentermine (ADIPEX-P) 37.5 mg tablet   9. Weight loss R63.4 783. 21 phentermine (ADIPEX-P) 37.5 mg tablet   10.  Encounter for refill of prescription for contraception Z30.40 V25.8 levonorgestrel-ethinyl estradiol (LARISSIA) 0.1-20 mg-mcg tab     Diagnoses and all orders for this visit:    1. Hypothyroidism, unspecified type  -     AMB POC EKG ROUTINE W/ 12 LEADS, INTER & REP    2. Hypertriglyceridemia  -     AMB POC EKG ROUTINE W/ 12 LEADS, INTER & REP    3. PCOS (polycystic ovarian syndrome)    4. Obesity, Class III, BMI 40-49.9 (morbid obesity) (HCC)  -     AMB POC EKG ROUTINE W/ 12 LEADS, INTER & REP  -     phentermine (ADIPEX-P) 37.5 mg tablet; Take 1 Tab by mouth every morning. Max Daily Amount: 37.5 mg.    5. Encounter for immunization    6. Vitamin B12 deficiency  -     cyanocobalamin (VITAMIN B12) 500 mcg tablet; Take 1 Tab by mouth daily. 7. Acute bilateral low back pain without sciatica  -     XR SPINE LUMB COMP W BEND; Future    8. Encounter for weight management  -     phentermine (ADIPEX-P) 37.5 mg tablet; Take 1 Tab by mouth every morning. Max Daily Amount: 37.5 mg.    9. Weight loss  -     phentermine (ADIPEX-P) 37.5 mg tablet; Take 1 Tab by mouth every morning. Max Daily Amount: 37.5 mg.    10. Encounter for refill of prescription for contraception  -     levonorgestrel-ethinyl estradiol ([facility] Musty) 0.1-20 mg-mcg tab; Take 1 Tab by mouth daily. Follow-up and Dispositions    · Return in about 4 weeks (around 1/1/2020) for Medication Check, Weight Mgmt, Mickey Lather #1 F/U.       lab results and schedule of future lab studies reviewed with patient  reviewed diet, exercise and weight control  reviewed medications and side effects in detail    lose weight, increase physical activity, call if any problems    Health Maintenance reviewed - reviewed, patient to get her flu vaccine later this week at her pharmacy, [facility], otherwise UTD. Recommended healthy diet low in carbohydrates, fats, sodium and cholesterol. Recommended regular cardiovascular exercise 3-6 times per week for 30-60 minutes daily. Chart is reviewed and updated today in the office. Records requested for other providers patient has seen and is currently seeing.     Patient was offered a choice/choices in the treatment plan today. Patient expresses understanding of the plan and agrees with recommendations. Verbal and written instructions (see AVS) provided. See patient instructions for more. Patient expresses understanding of diagnosis and treatment plan.

## 2019-12-04 NOTE — PROGRESS NOTES
Reviewed with the patient in the office today, 12/4/2019: Your lab results have been reviewed and are as follows:    Cholesterol Panel: Improved! :)  Total Cholesterol is 169 (goal <200). Triglycerides are 198. (goal <150)  Your HDL or \"good\" cholesterol is 38. (goal >40). Your LDL or \"bad\" cholesterol is 91.  (goal <100). Thyroid:  Your thyroid function is normal, at the higher end of normal but improved. We will recheck this in 3 months. TPO AB Elevated    Your vitamin B12 is on the low end of normal.  Recommend Vitamin B12 supplementation to achieve Vitamin B12 level above 500. We will recheck in 3 months. CMP:  Fasting blood sugar is normal at 98, your hemoglobin A1C is 5.3. You do not have diabetes. Kidney function is normal.   Your electrolytes are normal.   Your liver function is normal.     Aishwarya Chawla, MSN, MHA, FNP-BC

## 2019-12-04 NOTE — PATIENT INSTRUCTIONS
Reviewed with the patient in the office today, 12/4/2019: Your lab results have been reviewed and are as follows: 
 
Cholesterol Panel: Improved! :) 
Total Cholesterol is 169 (goal <200). Triglycerides are 198. (goal <150) Your HDL or \"good\" cholesterol is 38. (goal >40). Your LDL or \"bad\" cholesterol is 91.  (goal <100). Thyroid: 
Your thyroid function is normal, at the higher end of normal but improved. We will recheck this in 3 months. TPO AB Elevated Your vitamin B12 is on the low end of normal.  Recommend Vitamin B12 supplementation to achieve Vitamin B12 level above 500. We will recheck in 3 months. CMP: 
Fasting blood sugar is normal at 98, your hemoglobin A1C is 5.3. You do not have diabetes. Kidney function is normal.  
Your electrolytes are normal.  
Your liver function is normal.  
 
 
  
Back Pain: Care Instructions Your Care Instructions Back pain has many possible causes. It is often related to problems with muscles and ligaments of the back. It may also be related to problems with the nerves, discs, or bones of the back. Moving, lifting, standing, sitting, or sleeping in an awkward way can strain the back. Sometimes you don't notice the injury until later. Arthritis is another common cause of back pain. Although it may hurt a lot, back pain usually improves on its own within several weeks. Most people recover in 12 weeks or less. Using good home treatment and being careful not to stress your back can help you feel better sooner. Follow-up care is a key part of your treatment and safety. Be sure to make and go to all appointments, and call your doctor if you are having problems. It's also a good idea to know your test results and keep a list of the medicines you take. How can you care for yourself at home? · Sit or lie in positions that are most comfortable and reduce your pain. Try one of these positions when you lie down: ? Lie on your back with your knees bent and supported by large pillows. ? Lie on the floor with your legs on the seat of a sofa or chair. ? Lie on your side with your knees and hips bent and a pillow between your legs. ? Lie on your stomach if it does not make pain worse. · Do not sit up in bed, and avoid soft couches and twisted positions. Bed rest can help relieve pain at first, but it delays healing. Avoid bed rest after the first day of back pain. · Change positions every 30 minutes. If you must sit for long periods of time, take breaks from sitting. Get up and walk around, or lie in a comfortable position. · Try using a heating pad on a low or medium setting for 15 to 20 minutes every 2 or 3 hours. Try a warm shower in place of one session with the heating pad. · You can also try an ice pack for 10 to 15 minutes every 2 to 3 hours. Put a thin cloth between the ice pack and your skin. · Take pain medicines exactly as directed. ? If the doctor gave you a prescription medicine for pain, take it as prescribed. ? If you are not taking a prescription pain medicine, ask your doctor if you can take an over-the-counter medicine. · Take short walks several times a day. You can start with 5 to 10 minutes, 3 or 4 times a day, and work up to longer walks. Walk on level surfaces and avoid hills and stairs until your back is better. · Return to work and other activities as soon as you can. Continued rest without activity is usually not good for your back. · To prevent future back pain, do exercises to stretch and strengthen your back and stomach. Learn how to use good posture, safe lifting techniques, and proper body mechanics. When should you call for help? Call your doctor now or seek immediate medical care if: 
  · You have new or worsening numbness in your legs.  
  · You have new or worsening weakness in your legs. (This could make it hard to stand up.)  
  · You lose control of your bladder or bowels.  Watch closely for changes in your health, and be sure to contact your doctor if: 
  · You have a fever, lose weight, or don't feel well.  
  · You do not get better as expected. Where can you learn more? Go to http://amarjit-hipolito.info/. Enter A026 in the search box to learn more about \"Back Pain: Care Instructions. \" Current as of: June 26, 2019 Content Version: 12.2 © 3512-4621 [employer]. Care instructions adapted under license by [employer] (which disclaims liability or warranty for this information). If you have questions about a medical condition or this instruction, always ask your healthcare professional. [address] any warranty or liability for your use of this information. Polycystic Ovary Syndrome: Care Instructions Your Care Instructions Polycystic ovary syndrome, or PCOS, means a woman's hormones are out of balance. It can cause problems with your periods and make it hard to get pregnant. Doctors don't know for sure what causes PCOS, but it seems to run in families. It also seems to be linked to obesity and a risk for diabetes. If you have PCOS, your sisters and daughters have a higher chance of getting it too. You may have other symptoms. These include weight gain, acne, too much hair growth on the face or body, high blood pressure, and high blood sugar. Your ovaries may have cysts on them. These cysts are growths filled with fluid. Keep in mind that although you may not have regular periods, you can still get pregnant. Talk to your doctor about birth control if you do not want to get pregnant. Sometimes the hormone changes with PCOS can also make it hard for some women to get pregnant. If this is a concern, talk to your doctor about treatment for this problem. Women who have PCOS can go for months or longer with no period.  Your doctor may recommend medicines that can help get your cycles back to normal. 
 Follow-up care is a key part of your treatment and safety. Be sure to make and go to all appointments, and call your doctor if you are having problems. It's also a good idea to know your test results and keep a list of the medicines you take. How can you care for yourself at home? · Take your medicines exactly as prescribed. Call your doctor if you think you are having a problem with your medicine. · Eat a healthy diet. Include fruits, vegetables, beans, and whole grains in your diet each day. · If you are overweight, losing weight can help with many of the symptoms of PCOS. Talk to your doctor about safe ways to lose weight. · Get at least 30 minutes of exercise on most days of the week. Walking is a good choice. Or you can run, swim, cycle, or play tennis or team sports. · For hair growth you don't want, try bleaching, plucking, electrolysis, or laser therapy. · Acne can be treated with over-the-counter medicines. Look for ones that have benzoyl peroxide or salicylic acid in them. When should you call for help? Call your doctor now or seek immediate medical care if: 
  · You have severe vaginal bleeding.  
  · You have new or worse belly or pelvic pain.  
 Watch closely for changes in your health, and be sure to contact your doctor if: 
  · You do not get better as expected.  
  · You have unusual vaginal bleeding. Where can you learn more? Go to http://amarjit-hipolito.info/. Enter V443 in the search box to learn more about \"Polycystic Ovary Syndrome: Care Instructions. \" Current as of: February 19, 2019 Content Version: 12.2 © 2439-0039 [employer]. Care instructions adapted under license by [facility] (which disclaims liability or warranty for this information).  If you have questions about a medical condition or this instruction, always ask your healthcare professional. [address] any warranty or liability for your use of this information. DIET AND LIFESTYLE CHANGES 
? Practice putting your fork and spoon down between bites. ? Begin using smaller plates, bowls, and utensils. Ex: Salad Plate instead of dinner plate, salad fork instead of regular fork. ? EAT SLOWLY! It should take you at least 20-30 minutes to finish a meal.  
 
? Please begin taking a Complete Multivitamin every day ? It is recommended that you purchase kitchen measuring cups if you do not already have a set Guidelines for Success *Foods Not recommended* 
 
? Carbohydrates  
o They do not contain protein and will prevent weight loss. Breads Rice  Cereal Crackers Corn Pasta  Grains Chips  Cakes  Muffins Pretzels Popcorn Grits  Oatmeal  Peas Tortillas        Granola Waffles Pancakes      Biscuits Cream of Wheat   Potatoes    Cookies    Cornbread    Bagels ? Sugary Foods Cookies Cakes  Chocolate Ice Cream Milkshakes Juice  Jelly  Sauces BBQ Sauce Candy ? Fatty Foods Edra Sanes Foods    Creamy Sauces/Gravy Cheesecake      Full Fat [place]          Full Fat Salad Dressing Peanut Butter    Creamy Soups PROTEIN LIST  What you need to know Protein is essential for growth and development. It is part of every living cell and makes up tissue, skin, bone, hair, blood and muscle. It is especially important for those having bariatric surgery to follow a high protein, low carbohydrate diet. Please Note ? Lean protein sources that are naturally dry in texture may be more difficult tolerate.  (ex: chicken breast) ? It is important to avoid over cooking lean proteins (ie chicken breast, beef/pork tenderloin) ? Small bites and thorough chewing are essential for the tolerance of protein rich foods. ? Sometimes small sips of water may be needed to help food go down Foods High in Protein [place] ([facility]) Chicken Breast    
Chicken Thigh or Leg Fish [place] Jaime Clams/Mussels/Oysters [employer] Pork Tenderloin Ground Sirloin Taniya Castean Arin Low Fat Cheese [address] Egg [place] Yogurt Non [place] Yogurt FLUID INTAKE 
 
 
NOT recommended  beverages with sugar, carbonation (ie bubbles) Soda                                      Fruit Juice/V8             Sparkling water Diet soda                              Sports Drinks Lemonade                            Energy Drinks Sweet Tea                            Gourmet Coffee Beverages (ie Starbucks) Smoothies                            Alcoholic Beverages Recommended  sugar free, non-carbonated liquids Water                                     Unsweet Tea-may use sugar substitute Crystal Light                          Diet Jazmín Green Tea; Diet [place] Tea Bill                                         Decaf Coffee/Decaf Tea: use sugar free creamer Diet Lemonade                     Propel Diet Ocean Spray Juice       Sugar Free Jell-O- Limit to 1 cup per day Diet V8 Splash                      Sugar Free Popsicles- Limit to 2 per day * Avoid No Added Sugar Fudgesicles Diet Snapple                         Clear Broth (Chicken, Vegetable, Beef) Fruit 2 O                                Vitamin Water Zero/PowerAde Zero Hint Water Recommended Meal Replacement/Protein Shakes ? Premier Protein  Found at [employer], [employer], [employer], [address] [facility] 
? EAS AdvantEdge Carb Control Shake ? Slimfast High Protein  this is the only acceptable Slimfast Product ? Ensure High Protein- This is the only Acceptable Ensure Product ? Atkins Advantage ? Bariatric Advantage High Protein Meal Replacement (online www.bariatricadvantage. com) ? Muscle Milk 100 calorie ? Pure Protein Drinks ? Boost Glucose Control  this is the ONLY acceptable Boost Product ? Belg[place] Flores Protein Powders  Egg based protein powder  does not contain whey ? GNC Pro Performance 100% Soy Isolate  does not contain whey If you decide to choose a shake that is not on the above list. Please be sure it has the following:   
? Less than 230 calories ? At least 15 or more grams of protein ? Less than 20 grams of carbohydrates ? Less than 10 grams of fat Please read the nutrition label carefully! Nutrition Resources ? These website and apps allow you to track your food, fluid, and activity: 
o My Fitness Pal 
o Lose It 
o Fit Day 
o My Calorie Counter ? This website offers a FREE search engine and has a giant food and restaurant data base: www.[facility] CHECK YOUR SMART PHONE!! 
? Most phones today have free online tracker apps built in! Websites with Recipes ? wwwCloudLink Tech  - this site also contains a recipe calculator ? wwwStilnest CookBooks ? Deliciously Healthy Recipes from [place] by the [employer] [place]y. Can be purchased at [place]  
 
LETS GET MOVING!! 
? A pedometer is an easy, inexpensive way to challenge yourself daily to increase activity. Track your steps day one and aim to increase by 250 daily! Helpful Links and Resources The following websites may provide you with helpful information and resources. www. CalorieKing. com \"Calorie Trevor\" is an amazing resource where you can type in any food item you can think of whether at home or at restaurant and they will immediately provide you with detailed nutrition information regarding calories, fat, carbohydrate, protein, and sodium to name a few. We encourage you to start looking at the nutritional details of the foods that you regularly or periodically eat. You might be amazed by what you find.  
 
www. Conductor 
 The [employer] (or smartphone gilbert) is a useful tool to find the lowest prices of generic medications at your local pharmacies. On the website type in the name of your medication, choose the pharmacy, and print the coupon to bring to the pharmacy. On the smartphone gilbert type in the name of the medication, choose the pharmacy, and show the coupon code to the pharmacy staff to receive the discount.  
 
www. ObesityAction. org The Obesity Action Coalition [facility] is a more than 54,000 member non-profit organization dedicated to giving a voice to individuals affected by the disease of obesity. The [facility] helps individuals along their journey toward better health through education, advocacy, and support. Membership is $10 per year and whether you are a member or not the [facility] has amazing educational material and resources available on Jobspotting. tv 
Links to free online workout videos of any variety including chair exercises, resistance bands, strength, cardio, and many other exercise video options 
 
http://NutritionData. ROOOMERS.Spotbros \"Self Nutrition Data\" is an extremely detailed resource where you can look up the detailed nutritional information of essentially any food. Not only does it break down the \"macronutrients \"such carbohydrate, proteins, fats and fibers, but it also lists all the detailed \"micronutrient\" breakdowns of all vitamins and minerals to a degree not seen in other websites. www. EMISPHERE TECHNOLOGIESometer. com \"Map Pedometer\" is an interactive Google Map where you can accurately measure the distance of a walking or running route that you take. When you get to the website, at the top of the page, type in your starting point (street address, city, and state). Once your location is found, click on \"MAP\" on the top right of the Google Map so that you can see the streets around you. You can zoom in and out on the map using your scroll wheel. Your starting location is marked with a \"yellow josh\" . .. from there double click on the points on street map tracing your route. This will tell you the exact distance of your route and can also help you plan future running/walking/biking routes. www. FitBit. com The FitBit is an amazing electronic device that you wear which accurately counts your steps, your distance, your vertical steps up stairs, and even measures your sleep. This device syncs to your computer, iPhone, or Smart Phone and you can accurately review all your data over time, and even share it with others to form a \"friend\" group.  
 
www. ObesityMedicine. org The Obesity Medicine Association [facility]) is the largest medical society for physicians and healthcare providers that specialize in the field of weight loss (obesity medicine / bariatric medicine). This website also lists all the physicians in your area that specialize in weight loss. 
 
www. BaokunessPal.com MyFitnessPal is a smartphone ap that allows you to accurately log your food intake and even uses the smartphone camera to scan food package barcodes. It has an extensive food database and can also be synced to your regular computer as well. This ap can also sync with the FitBit to track both your food intake (MyFitnessPal) and exercise (Fitbit) information. www. Diabetes. org This website is for the American Diabetes Association (ADA) and has extensive information about diabetes in general, living with diabetes, nutrition recommendations, and detailed information about all things pertaining to diabetes. www. EatRight. org This website is for the American Dietetic Association (ADA) and is a vast wealth if information regarding nutrition and \"'eating right. \" I would encourage you to explore their website to find information that you can find helpful, practical, and enjoyable. REMEMBER The most effective way to track your intake is by keeping a Daily Food Journal.  Writing down the items you eat with their calorie, carbohydrate, and protein totals, is key to your life long success. GETTING STARTED CHECKLIST ? Clean House! Eliminate carbohydrates and liquid calories from your diet and your environment. ? Read all food labels! ? Record your daily intake in a food diary. ? Begin planning your meals and snacks in advance. ? Begin using smaller plates and utensils  this will assist in decreasing your portion sizes of food. ? Practice chewing foods slowly prior to swallowing. ? Include a lean protein source at all meals. ? Sip 48-64 oz (6-8 cups) of fluids throughout the day for hydration. ? Limit restaurant meals. When dining out, ask for a box with the delivery of your meal.  Only put the appropriate portion on your plate, place the rest in the box or share with a . ? Add movement to your day! Whether swimming, walking, or biking activity is essential to meet and maintain your weight loss goals. ? Set reasonable and attainable goals. 2 lbs weekly weight loss is excellent! ? Surround yourself with positive influences and supportive individuals. ? Begin taking a Multivitamin Supplement Daily ? Take a deep breath and smile!!  REMEMBER  YOU CAN DO THIS!!! These are the initial steps to what will be a positive, healthy, and amazing life change.

## 2019-12-04 NOTE — PROGRESS NOTES
Room 4    Identified pt with two pt identifiers(name and ). Reviewed record in preparation for visit and have obtained necessary documentation. All patient medications has been reviewed. Chief Complaint   Patient presents with    Weight Management     1 month f/u    Thyroid Problem    Vitamin B12 Deficiency     Weight Management  Waist:31cm  Neck:31cm/16.5in  45.5% body fat  41.7% water  1990 resting energy    There are no preventive care reminders to display for this patient. Vitals:    19 0815 19 1022   BP: 158/83 129/67   Pulse: 81    Resp: 18    Temp: 97.9 °F (36.6 °C)    TempSrc: Oral    SpO2: 98%    Weight: 280 lb 4.8 oz (127.1 kg)    Height: 5' 6.26\" (1.683 m)    PainSc:   0 - No pain    LMP: 2019       Wt Readings from Last 3 Encounters:   19 280 lb 4.8 oz (127.1 kg)   10/09/19 282 lb 11.2 oz (128.2 kg)   19 283 lb 12.8 oz (128.7 kg)     Temp Readings from Last 3 Encounters:   19 97.9 °F (36.6 °C) (Oral)   10/09/19 98.7 °F (37.1 °C) (Oral)   19 98.3 °F (36.8 °C) (Oral)     BP Readings from Last 3 Encounters:   19 129/67   10/09/19 118/85   19 118/76     Pulse Readings from Last 3 Encounters:   19 81   10/09/19 79   19 94       Lab Results   Component Value Date/Time    Hemoglobin A1c 5.3 2019 08:08 AM    Hemoglobin A1c (POC) 5.1 2014 06:21 PM       Coordination of Care Questionnaire:   1) Have you been to an emergency room, urgent care, or hospitalized since your last visit?   no       2. Have seen or consulted any other health care provider since your last visit? NO    3) Do you have an Advanced Directive/ Living Will in place? NO  If yes, do we have a copy on file NO  If no, would you like information NO    Patient is accompanied by self I have received verbal consent from Blanco Day to discuss any/all medical information while they are present in the room.

## 2020-01-07 ENCOUNTER — OFFICE VISIT (OUTPATIENT)
Dept: FAMILY MEDICINE CLINIC | Age: 35
End: 2020-01-07

## 2020-01-07 VITALS
TEMPERATURE: 97.8 F | RESPIRATION RATE: 16 BRPM | HEART RATE: 95 BPM | OXYGEN SATURATION: 96 % | SYSTOLIC BLOOD PRESSURE: 129 MMHG | HEIGHT: 66 IN | WEIGHT: 274.5 LBS | BODY MASS INDEX: 44.11 KG/M2 | DIASTOLIC BLOOD PRESSURE: 81 MMHG

## 2020-01-07 DIAGNOSIS — E66.01 OBESITY, CLASS III, BMI 40-49.9 (MORBID OBESITY) (HCC): ICD-10-CM

## 2020-01-07 DIAGNOSIS — E78.5 DYSLIPIDEMIA: ICD-10-CM

## 2020-01-07 DIAGNOSIS — E03.9 HYPOTHYROIDISM, UNSPECIFIED TYPE: Primary | ICD-10-CM

## 2020-01-07 DIAGNOSIS — E53.8 VITAMIN B12 DEFICIENCY: ICD-10-CM

## 2020-01-07 DIAGNOSIS — Z76.89 ENCOUNTER FOR WEIGHT MANAGEMENT: ICD-10-CM

## 2020-01-07 DIAGNOSIS — E55.9 VITAMIN D DEFICIENCY: ICD-10-CM

## 2020-01-07 DIAGNOSIS — R63.4 WEIGHT LOSS: ICD-10-CM

## 2020-01-07 RX ORDER — LEVOTHYROXINE SODIUM 25 UG/1
25 TABLET ORAL
Qty: 90 TAB | Refills: 1 | Status: SHIPPED | OUTPATIENT
Start: 2020-01-07

## 2020-01-07 RX ORDER — PHENTERMINE HYDROCHLORIDE 37.5 MG/1
37.5 TABLET ORAL
Qty: 30 TAB | Refills: 1 | Status: SHIPPED | OUTPATIENT
Start: 2020-01-07 | End: 2020-02-25 | Stop reason: SDUPTHER

## 2020-01-07 NOTE — PROGRESS NOTES
Chief Complaint   Patient presents with    Weight Management     1m f/u         HPI:  The patient is a 27 y.o. female who presents today for a follow up appointment. No hospital, ER or specialist visits since last primary care visit except as noted below. Weight Management:          The patient is a 27y.o. year old obese female who presents today for medical weight loss, her PCP is Chad Medina NP. Pt's initial weight is 280.5 pounds with a BMI of 44.89 on 12/4/2019. Patient's highest weight was 289 pounds. Patient's weight today is 274.8 pounds with a BMI of 43.96. She has lost 6 pounds since her last visit. Patient's goal weight is 200-215 pounds ideally, however she would be ok with 240-250 pounds. The patient's bariatric comorbidities include PCOS, hypothyroidism, XOL. The patient's reason for medical weight loss is improved health and mobility. The patient's quality of life goals are riding roller coasters. 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  She has previously tried the following medications:  Phentermine. She was started on Phentermine at her last visit on 12/4/2019. She reports some insomnia initially, but that has resolved. She took 1/2 tab x approximately 1 week but ultimately advanced to a whole tab daily. Denies any further adverse effects.   Hunger is well controlled. Weight Metrics 1/7/2020 12/4/2019 12/4/2019 10/9/2019 10/9/2019 5/6/2019 12/18/2017   Weight 274 lb 8 oz - 280 lb 4.8 oz - 282 lb 11.2 oz 283 lb 12.8 oz 286 lb   Neck Circ (inches) - 16.5 - - - - -   Waist Measure Inches - 31 - 53 - - -   Body Fat % - 45.5 - 45.7 - - -   BMI 43.96 kg/m2 - 44.89 kg/m2 - 44.28 kg/m2 44.45 kg/m2 44.79 kg/m2     EKG done today: NSR    PCOS/GYN:  -started on Metformin, has tolerated this well since changed to extended release. She is taking both at dinner time without issue.  -saw GYN for Pap on 4/1/2019: normal pap at that time    LBP:  Patient reports when she gets up from lying on her stomach she has significant LBP. She reports that she cannot stand up straight right away but it quickly resolved spontaneously. Labs:  Reviewed with the patient in the office today, 12/4/2019: Your lab results have been reviewed and are as follows:    Cholesterol Panel: Improved! :)  Total Cholesterol is 169 (goal <200). Triglycerides are 198. (goal <150)  Your HDL or \"good\" cholesterol is 38. (goal >40). Your LDL or \"bad\" cholesterol is 91.  (goal <100). Thyroid:  Your thyroid function is normal, at the higher end of normal but improved. We will recheck this in 3 months. TPO AB Elevated    Your vitamin B12 is on the low end of normal.  Recommend Vitamin B12 supplementation to achieve Vitamin B12 level above 500. We will recheck in 3 months. CMP:  Fasting blood sugar is normal at 98, your hemoglobin A1C is 5.3. You do not have diabetes. Kidney function is normal.   Your electrolytes are normal.   Your liver function is normal.     Review of Systems  A comprehensive review of systems was negative except for that written in the HPI.     Patient Active Problem List   Diagnosis Code    Allergic rhinitis J30.9    History of hypothyroidism Z86.39    Iron deficiency anemia D50.9    Obesity, Class III, BMI 40-49.9 (morbid obesity) ([facility] [address] E66.01    Dyslipidemia E78.5    Vitamin D deficiency E55.9    Hypothyroidism E03.9    Family history of melanoma Z80.8    PCOS (polycystic ovarian syndrome) E28.2    Irregular periods N92.6    Pain in pelvis R10.2    Pain of upper abdomen R10.10    Constipation K59.00    Hypertriglyceridemia E78.1    Vitamin B12 deficiency E53.8    Acute bilateral low back pain without sciatica M54.5    Encounter for weight management Z76.89    Weight loss R63.4       Past Medical History:   Diagnosis Date    Abnormal Pap smear 08    Abnormal Pap smear of cervix     saw OBGYN    Allergy, unspecified not elsewhere classified     Childhood asthma childhood    may need alb with URI    DUB (dysfunctional uterine bleeding) , recurred ~     Dyslipidemia 2015    Family history of melanoma     Hand pain 2013    Bilaterally. 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  Hypothyroid     Iron deficiency anemia 2013    Mononucleosis elementary, middle school, high school    PCOS (polycystic ovarian syndrome) 2017    PCOS (polycystic ovarian syndrome) 2017    Vertigo 2017    Prn meclizine. Trigger sinus infection    Vitamin D deficiency 2015       Past Surgical History:   Procedure Laterality Date    HX  SECTION  2009    HX TYMPANOSTOMY Bilateral infant    INSERT PICC LINE Left     d/t abscess after        Social History     Tobacco Use    Smoking status: Former Smoker     Packs/day: 0.75     Years: 3.00     Pack years: 2.25     Types: Cigarettes     Last attempt to quit: 2005     Years since quitting: 15.0    Smokeless tobacco: Never Used   Substance Use Topics    Alcohol use:  Yes     Alcohol/week: 0.8 standard drinks     Types: 1 Glasses of wine per week     Comment: rare    Drug use: No       Family History   Problem Relation Age of Onset    Hypertension Father     Stroke Father         TIA    Cancer Father         spinal    High Cholesterol Mother     Other Mother         prediabetes    Heart Disease Mother         irregular heartbeat    Endometriosis Mother     Cancer Maternal Grandmother         lung     Cancer Paternal Grandmother         lymphoma/ brain     Diabetes Paternal Grandmother     Heart Disease Paternal Grandmother     Heart Disease Maternal Grandfather     Cancer Maternal Grandfather         skin- melanoma    Hypertension Brother     Depression Brother     No Known Problems Son        Outpatient Medications Marked as Taking for the 1/7/20 encounter (Office Visit) with Kel Raines NP   Medication Sig Dispense Refill    phentermine (ADIPEX-P) 37.5 mg tablet Take 1 Tab by mouth every morning. Max Daily Amount: 37.5 mg. 30 Tab 1    levothyroxine (SYNTHROID) 25 mcg tablet Take 1 Tab by mouth Daily (before breakfast). 90 Tab 1    cyanocobalamin (VITAMIN B12) 500 mcg tablet Take 1 Tab by mouth daily. 90 Tab 0    levonorgestrel-ethinyl estradiol (LARISSIA) 0.1-20 mg-mcg tab Take 1 Tab by mouth daily. 3 Dose Pack 3    metFORMIN (GLUMETZA ER) 500 mg TG24 24 hour tablet metformin  mg tablet,extended release 24 hr   2 tablets every evening      MULTIVITAMIN PO Take 1 Tab by mouth two (2) times a day.  dicyclomine (BENTYL) 10 mg capsule dicyclomine 10 mg capsule   Take 1 capsule twice a day by oral route.  aspirin/acetaminophen/caffeine (HEADACHE RELIEF, ASA-ACET-CAF, PO) Take  by mouth.  fenofibrate nanocrystallized (TRICOR) 48 mg tablet Take 1 Tab by mouth daily. 90 Tab 1    cholecalciferol (VITAMIN D3) 1,000 unit cap 1,000 Units daily.  senna (SENNA LAX) 8.6 mg tablet Senna Lax      docusate sodium (STOOL SOFTENER) 100 mg capsule Stool Softener      bran-gum-fib-dana-psyl-kelp-pec (FIBER 6) 1,000 mg tab fiber      fluticasone (FLONASE) 50 mcg/actuation nasal spray 2 Sprays by Both Nostrils route daily.  loratadine 10 mg cap Take  by mouth.       albuterol (PROVENTIL HFA, VENTOLIN HFA, PROAIR HFA) 90 mcg/actuation inhaler Take 2 Puffs by inhalation every four (4) hours as needed for Wheezing or Shortness of Breath (cough). 1 Inhaler 0    ascorbic acid, vitamin C, (VITAMIN C) 250 mg tablet Take  by mouth. Current Outpatient Medications on File Prior to Visit   Medication Sig Dispense Refill    cyanocobalamin (VITAMIN B12) 500 mcg tablet Take 1 Tab by mouth daily. 90 Tab 0    levonorgestrel-ethinyl estradiol (LARISSIA) 0.1-20 mg-mcg tab Take 1 Tab by mouth daily. 3 Dose Pack 3    metFORMIN (GLUMETZA ER) 500 mg TG24 24 hour tablet metformin  mg tablet,extended release 24 hr   2 tablets every evening      MULTIVITAMIN PO Take 1 Tab by mouth two (2) times a day.  dicyclomine (BENTYL) 10 mg capsule dicyclomine 10 mg capsule   Take 1 capsule twice a day by oral route.  aspirin/acetaminophen/caffeine (HEADACHE RELIEF, ASA-ACET-CAF, PO) Take  by mouth.  fenofibrate nanocrystallized (TRICOR) 48 mg tablet Take 1 Tab by mouth daily. 90 Tab 1    cholecalciferol (VITAMIN D3) 1,000 unit cap 1,000 Units daily.  senna (SENNA LAX) 8.6 mg tablet Senna Lax      docusate sodium (STOOL SOFTENER) 100 mg capsule Stool Softener      bran-gum-fib-dana-psyl-kelp-pec (FIBER 6) 1,000 mg tab fiber      fluticasone (FLONASE) 50 mcg/actuation nasal spray 2 Sprays by Both Nostrils route daily.  loratadine 10 mg cap Take  by mouth.  albuterol (PROVENTIL HFA, VENTOLIN HFA, PROAIR HFA) 90 mcg/actuation inhaler Take 2 Puffs by inhalation every four (4) hours as needed for Wheezing or Shortness of Breath (cough). 1 Inhaler 0    ascorbic acid, vitamin C, (VITAMIN C) 250 mg tablet Take  by mouth.  [DISCONTINUED] phentermine (ADIPEX-P) 37.5 mg tablet Take 1 Tab by mouth every morning. Max Daily Amount: 37.5 mg. 30 Tab 0    [DISCONTINUED] levothyroxine (SYNTHROID) 25 mcg tablet Take 1 Tab by mouth Daily (before breakfast).  90 Tab 1     No current facility-administered medications on file prior to visit. Allergies   Allergen Reactions    Morphine Other (comments) and Seizures     Tremors       PE:  Visit Vitals  /81 (BP 1 Location: Right arm, BP Patient Position: Sitting)   Pulse 95   Temp 97.8 °F (36.6 °C) (Oral)   Resp 16   Ht 5' 6.26\" (1.683 m)   Wt 274 lb 8 oz (124.5 kg)   LMP 12/23/2019   SpO2 96%   BMI 43.96 kg/m²       Gen: alert, oriented, no acute distress  Head: normocephalic, atraumatic  Ears: external auditory canals clear, TMs without erythema or effusion  Eyes: pupils equal round reactive to light, sclera clear, conjunctiva clear  Oral: moist mucus membranes, no oral lesions, no pharyngeal inflammation or exudate  Neck: symmetric normal sized thyroid, no carotid bruits, no jugular vein distention  Resp: no increase work of breathing, lungs clear to ausculation bilaterally, no wheezing, rales or rhonchi  CV: S1, S2 normal.  No murmurs, rubs, or gallops. Abd: soft, not tender, not distended. No hepatosplenomegaly. Normal bowel sounds. No hernias. No abdominal or renal bruits. Neuro: cranial nerves intact, normal strength and movement in all extremities, reflexes and sensation intact and symmetric. Skin: no lesion or rash  Extremities: no cyanosis or edema    No results found for this visit on 01/07/20. Assessment/Plan:    ICD-10-CM ICD-9-CM    1. Hypothyroidism, unspecified type E03.9 244.9 levothyroxine (SYNTHROID) 25 mcg tablet      TSH 3RD GENERATION      T4 (THYROXINE)      THYROID PEROXIDASE (TPO) AB   2. Obesity, Class III, BMI 40-49.9 (morbid obesity) (HCC) E66.01 278.01 phentermine (ADIPEX-P) 37.5 mg tablet   3. Encounter for weight management Z76.89 V65.49 phentermine (ADIPEX-P) 37.5 mg tablet   4. Weight loss R63.4 783. 21 phentermine (ADIPEX-P) 37.5 mg tablet   5. Vitamin B12 deficiency E53.8 266.2 VITAMIN B12   6. Vitamin D deficiency E55.9 268.9 VITAMIN D, 25 HYDROXY   7. Dyslipidemia E78.5 272.4 LIPID PANEL     Diagnoses and all orders for this visit:    1. Hypothyroidism, unspecified type  -     levothyroxine (SYNTHROID) 25 mcg tablet; Take 1 Tab by mouth Daily (before breakfast). -     TSH 3RD GENERATION; Future  -     T4 (THYROXINE); Future  -     THYROID PEROXIDASE (TPO) AB; Future    2. Obesity, Class III, BMI 40-49.9 (morbid obesity) (HCC)  -     phentermine (ADIPEX-P) 37.5 mg tablet; Take 1 Tab by mouth every morning. Max Daily Amount: 37.5 mg.    3. Encounter for weight management  -     phentermine (ADIPEX-P) 37.5 mg tablet; Take 1 Tab by mouth every morning. Max Daily Amount: 37.5 mg.    4. Weight loss  -     phentermine (ADIPEX-P) 37.5 mg tablet; Take 1 Tab by mouth every morning. Max Daily Amount: 37.5 mg.    5. Vitamin B12 deficiency  -     VITAMIN B12; Future    6. Vitamin D deficiency  -     VITAMIN D, 25 HYDROXY; Future    7. Dyslipidemia  -     LIPID PANEL; Future      Follow-up and Dispositions    · Return in about 2 months (around 3/7/2020) for Medication Check, Weight Mgmt, Labs. Labs: recheck XOL, Vitamin B12, TSH in 3/2020  reviewed diet, exercise and weight control  reviewed medications and side effects in detail    lose weight, increase physical activity, continue present plan, call if any problems    Health Maintenance reviewed - reviewed, UTD. Recommended healthy diet low in carbohydrates, fats, sodium and cholesterol. Recommended regular cardiovascular exercise 3-6 times per week for 30-60 minutes daily. Chart is reviewed and updated today in the office. Records requested for other providers patient has seen and is currently seeing. Patient was offered a choice/choices in the treatment plan today. Patient expresses understanding of the plan and agrees with recommendations. Verbal and written instructions (see AVS) provided. See patient instructions for more. Patient expresses understanding of diagnosis and treatment plan.

## 2020-01-07 NOTE — PROGRESS NOTES
Chief Complaint   Patient presents with    Weight Management     1m f/u     1. Have you been to the ER, urgent care clinic since your last visit? Hospitalized since your last visit? No    2. Have you seen or consulted any other health care providers outside of the [address] [place] [address] since your last visit? Include any pap smears or colon screening.  No    274.5lbs  45% fat  42.7% mass  41.9% water  1964 energy

## 2020-01-08 DIAGNOSIS — E78.5 DYSLIPIDEMIA: ICD-10-CM

## 2020-01-08 DIAGNOSIS — E55.9 VITAMIN D DEFICIENCY: ICD-10-CM

## 2020-01-08 DIAGNOSIS — E03.9 HYPOTHYROIDISM, UNSPECIFIED TYPE: ICD-10-CM

## 2020-01-08 DIAGNOSIS — E53.8 VITAMIN B12 DEFICIENCY: ICD-10-CM

## 2020-01-08 NOTE — PATIENT INSTRUCTIONS
Hypothyroidism: Care Instructions  Your Care Instructions    You have hypothyroidism, which means that your body is not making enough thyroid hormone. This hormone helps your body use energy. If your thyroid level is low, you may feel tired, be constipated, have an increase in your blood pressure, or have dry skin or memory problems. You may also get cold easily, even when it is warm. Women with low thyroid levels may have heavy menstrual periods. A blood test to find your thyroid-stimulating hormone (TSH) level is used to check for hypothyroidism. A high TSH level may mean that you have low thyroid. When your body is not making enough thyroid hormone, TSH levels rise in an effort to make the body produce more. The treatment for hypothyroidism is to take thyroid hormone pills. You should start to feel better in 1 to 2 weeks. But it can take several months to see changes in the TSH level. You will need regular visits with your doctor to make sure you have the right dose of medicine. Most people need treatment for the rest of their lives. You will need to see your doctor regularly to have blood tests and to make sure you are doing well. Follow-up care is a key part of your treatment and safety. Be sure to make and go to all appointments, and call your doctor if you are having problems. It's also a good idea to know your test results and keep a list of the medicines you take. How can you care for yourself at home? · Take your thyroid hormone medicine exactly as prescribed. Call your doctor if you think you are having a problem with your medicine. Most people do not have side effects if they take the right amount of medicine regularly. ? Take the medicine 30 minutes before breakfast, and do not take it with calcium, vitamins, or iron. ? Do not take extra doses of your thyroid medicine. It will not help you get better any faster, and it may cause side effects.   ? If you forget to take a dose, do NOT take a double dose of medicine. Take your usual dose the next day. · Tell your doctor about all prescription, herbal, or over-the-counter products you take. · Take care of yourself. Eat a healthy diet, get enough sleep, and get regular exercise. When should you call for help? Call 911 anytime you think you may need emergency care. For example, call if:    · You passed out (lost consciousness).     · You have severe trouble breathing.     · You have a very slow heartbeat (less than 60 beats a minute).     · You have a low body temperature (95°F or below).    Call your doctor now or seek immediate medical care if:    · You feel tired, sluggish, or weak.     · You have trouble remembering things or concentrating.     · You do not begin to feel better 2 weeks after starting your medicine.    Watch closely for changes in your health, and be sure to contact your doctor if you have any problems. Where can you learn more? Go to http://amarjit-hipolito.info/. Enter G124 in the search box to learn more about \"Hypothyroidism: Care Instructions. \"  Current as of: November 6, 2018  Content Version: 12.2  © 1957-5716 [employer]. Care instructions adapted under license by [employer] (which disclaims liability or warranty for this information). If you have questions about a medical condition or this instruction, always ask your healthcare professional. [address] any warranty or liability for your use of this information. Learning About Low-Carbohydrate Diets for Weight Loss  What is a low-carbohydrate diet? Low-carb diets avoid foods that are high in carbohydrate. These high-carb foods include pasta, bread, rice, cereal, fruits, and starchy vegetables. Instead, these diets usually have you eat foods that are high in fat and protein. Many people lose weight quickly on a low-carb diet. But the early weight loss is water.  People on this diet often gain the weight back after they start eating carbs again. Not all diet plans are safe or work well. A lot of the evidence shows that low-carb diets aren't healthy. That's because these diets often don't include healthy foods like fruits and vegetables. Losing weight safely means balancing protein, fat, and carbs with every meal and snack. And low-carb diets don't always provide the vitamins, minerals, and fiber you need. If you have a serious medical condition, talk to your doctor before you try any diet. These conditions include kidney disease, heart disease, type 2 diabetes, high cholesterol, and high blood pressure. If you are pregnant, it may not be safe for your baby if you are on a low-carb diet. How can you lose weight safely? You might have heard that a diet plan helped another person lose weight. But that doesn't mean that it will work for you. It is very hard to stay on a diet that includes lots of big changes in your eating habits. If you want to get to a healthy weight and stay there, making healthy lifestyle changes will often work better than dieting. These steps can help. · Make a plan for change. Work with your doctor to create a plan that is right for you. · See a dietitian. He or she can show you how to make healthy changes in your eating habits. · Manage stress. If you have a lot of stress in your life, it can be hard to focus on making healthy changes to your daily habits. · Track your food and activity. You are likely to do better at losing weight if you keep track of what you eat and what you do. Follow-up care is a key part of your treatment and safety. Be sure to make and go to all appointments, and call your doctor if you are having problems. It's also a good idea to know your test results and keep a list of the medicines you take. Where can you learn more? Go to http://amarjit-hipolito.info/.   Enter [phone] [id number] [phone] in the search box to learn more about \"Learning About Low-Carbohydrate Diets for Weight Loss. \"  Current as of: November 7, 2018  Content Version: 12.2  © 9310-0005 [employer]. Care instructions adapted under license by [employer] (which disclaims liability or warranty for this information). If you have questions about a medical condition or this instruction, always ask your healthcare professional. [address] any warranty or liability for your use of this information.

## 2020-02-25 ENCOUNTER — OFFICE VISIT (OUTPATIENT)
Dept: FAMILY MEDICINE CLINIC | Age: 35
End: 2020-02-25

## 2020-02-25 VITALS
HEIGHT: 66 IN | BODY MASS INDEX: 42.51 KG/M2 | HEART RATE: 90 BPM | OXYGEN SATURATION: 98 % | SYSTOLIC BLOOD PRESSURE: 137 MMHG | TEMPERATURE: 98.2 F | RESPIRATION RATE: 18 BRPM | WEIGHT: 264.5 LBS | DIASTOLIC BLOOD PRESSURE: 75 MMHG

## 2020-02-25 DIAGNOSIS — E53.8 VITAMIN B12 DEFICIENCY: ICD-10-CM

## 2020-02-25 DIAGNOSIS — E78.1 HYPERTRIGLYCERIDEMIA: ICD-10-CM

## 2020-02-25 DIAGNOSIS — E03.9 HYPOTHYROIDISM, UNSPECIFIED TYPE: Primary | ICD-10-CM

## 2020-02-25 DIAGNOSIS — E55.9 VITAMIN D DEFICIENCY: ICD-10-CM

## 2020-02-25 DIAGNOSIS — E28.2 PCOS (POLYCYSTIC OVARIAN SYNDROME): ICD-10-CM

## 2020-02-25 DIAGNOSIS — E66.01 OBESITY, CLASS III, BMI 40-49.9 (MORBID OBESITY) (HCC): ICD-10-CM

## 2020-02-25 DIAGNOSIS — Z76.89 ENCOUNTER FOR WEIGHT MANAGEMENT: ICD-10-CM

## 2020-02-25 DIAGNOSIS — K59.00 CONSTIPATION, UNSPECIFIED CONSTIPATION TYPE: ICD-10-CM

## 2020-02-25 DIAGNOSIS — R05.9 COUGH: ICD-10-CM

## 2020-02-25 DIAGNOSIS — F41.9 ANXIETY: ICD-10-CM

## 2020-02-25 DIAGNOSIS — R63.4 WEIGHT LOSS: ICD-10-CM

## 2020-02-25 PROBLEM — R10.2 PAIN IN PELVIS: Status: RESOLVED | Noted: 2019-04-15 | Resolved: 2020-02-25

## 2020-02-25 PROBLEM — M54.50 ACUTE BILATERAL LOW BACK PAIN WITHOUT SCIATICA: Status: RESOLVED | Noted: 2019-12-04 | Resolved: 2020-02-25

## 2020-02-25 PROBLEM — R10.10 PAIN OF UPPER ABDOMEN: Status: RESOLVED | Noted: 2019-05-06 | Resolved: 2020-02-25

## 2020-02-25 RX ORDER — PHENTERMINE HYDROCHLORIDE 37.5 MG/1
37.5 TABLET ORAL
Qty: 60 TAB | Refills: 0 | Status: SHIPPED | OUTPATIENT
Start: 2020-02-25

## 2020-02-25 RX ORDER — METFORMIN HYDROCHLORIDE 500 MG/1
TABLET, EXTENDED RELEASE ORAL
COMMUNITY
Start: 2020-02-07 | End: 2020-02-25 | Stop reason: SDUPTHER

## 2020-02-25 RX ORDER — BUSPIRONE HYDROCHLORIDE 7.5 MG/1
TABLET ORAL
Qty: 60 TAB | Refills: 1 | Status: SHIPPED | OUTPATIENT
Start: 2020-02-25

## 2020-02-25 RX ORDER — METFORMIN HYDROCHLORIDE 500 MG/1
TABLET, EXTENDED RELEASE ORAL
Qty: 180 TAB | Refills: 1 | Status: SHIPPED | OUTPATIENT
Start: 2020-02-25

## 2020-02-25 RX ORDER — ALBUTEROL SULFATE 90 UG/1
2 AEROSOL, METERED RESPIRATORY (INHALATION)
Qty: 1 INHALER | Refills: 2 | Status: SHIPPED | OUTPATIENT
Start: 2020-02-25

## 2020-02-25 NOTE — PROGRESS NOTES
Jessenia Diamond is a 38 y.o. female  Chief Complaint   Patient presents with    Weight Management     There are no preventive care reminders to display for this patient. Visit Vitals  /75   Pulse (!) 116   Temp 98.2 °F (36.8 °C) (Oral)   Resp 18   Ht 5' 6\" (1.676 m)   Wt 264 lb 8 oz (120 kg)   SpO2 98%   BMI 42.69 kg/m²     1. Have you been to the ER, urgent care clinic since your last visit? Hospitalized since your last visit? No    2. Have you seen or consulted any other health care providers outside of the [address] [place] [address] since your last visit? Include any pap smears or colon screening.  No     Water:42.4  [employer]: [id number]

## 2020-02-25 NOTE — PATIENT INSTRUCTIONS
High Cholesterol: Care Instructions  Your Care Instructions    Cholesterol is a type of fat in your blood. It is needed for many body functions, such as making new cells. Cholesterol is made by your body. It also comes from food you eat. High cholesterol means that you have too much of the fat in your blood. This raises your risk of a heart attack and stroke. LDL and HDL are part of your total cholesterol. LDL is the \"bad\" cholesterol. High LDL can raise your risk for heart disease, heart attack, and stroke. HDL is the \"good\" cholesterol. It helps clear bad cholesterol from the body. High HDL is linked with a lower risk of heart disease, heart attack, and stroke. Your cholesterol levels help your doctor find out your risk for having a heart attack or stroke. You and your doctor can talk about whether you need to lower your risk and what treatment is best for you. A heart-healthy lifestyle along with medicines can help lower your cholesterol and your risk. The way you choose to lower your risk will depend on how high your risk is for heart attack and stroke. It will also depend on how you feel about taking medicines. Follow-up care is a key part of your treatment and safety. Be sure to make and go to all appointments, and call your doctor if you are having problems. It's also a good idea to know your test results and keep a list of the medicines you take. How can you care for yourself at home? · Eat a variety of foods every day. Good choices include fruits, vegetables, whole grains (like oatmeal), dried beans and peas, nuts and seeds, soy products (like tofu), and fat-free or low-fat dairy products. · Replace butter, margarine, and hydrogenated or partially hydrogenated oils with olive and canola oils. (Canola oil margarine without trans fat is fine.)  · Replace red meat with fish, poultry, and soy protein (like tofu). · Limit processed and packaged foods like chips, crackers, and cookies.   · Bake, broil, or steam foods. Don't alvarez them. · Be physically active. Get at least 30 minutes of exercise on most days of the week. Walking is a good choice. You also may want to do other activities, such as running, swimming, cycling, or playing tennis or team sports. · Stay at a healthy weight or lose weight by making the changes in eating and physical activity listed above. Losing just a small amount of weight, even 5 to 10 pounds, can reduce your risk for having a heart attack or stroke. · Do not smoke. When should you call for help? Watch closely for changes in your health, and be sure to contact your doctor if:    · You need help making lifestyle changes.     · You have questions about your medicine. Where can you learn more? Go to http://amarjitEPIOMED THERAPEUTICShipolito.info/. Enter H924 in the search box to learn more about \"High Cholesterol: Care Instructions. \"  Current as of: April 9, 2019  Content Version: 12.2  © 7831-4048 [employer]. Care instructions adapted under license by [employer] (which disclaims liability or warranty for this information). If you have questions about a medical condition or this instruction, always ask your healthcare professional. [place] [address] any warranty or liability for your use of this information. Polycystic Ovary Syndrome: Care Instructions  Your Care Instructions  Polycystic ovary syndrome, or PCOS, means a woman's hormones are out of balance. It can cause problems with your periods and make it hard to get pregnant. Doctors don't know for sure what causes PCOS, but it seems to run in families. It also seems to be linked to obesity and a risk for diabetes. If you have PCOS, your sisters and daughters have a higher chance of getting it too. You may have other symptoms. These include weight gain, acne, too much hair growth on the face or body, high blood pressure, and high blood sugar.  Your ovaries may have cysts on them. These cysts are growths filled with fluid. Keep in mind that although you may not have regular periods, you can still get pregnant. Talk to your doctor about birth control if you do not want to get pregnant. Sometimes the hormone changes with PCOS can also make it hard for some women to get pregnant. If this is a concern, talk to your doctor about treatment for this problem. Women who have PCOS can go for months or longer with no period. Your doctor may recommend medicines that can help get your cycles back to normal.  Follow-up care is a key part of your treatment and safety. Be sure to make and go to all appointments, and call your doctor if you are having problems. It's also a good idea to know your test results and keep a list of the medicines you take. How can you care for yourself at home? · Take your medicines exactly as prescribed. Call your doctor if you think you are having a problem with your medicine. · Eat a healthy diet. Include fruits, vegetables, beans, and whole grains in your diet each day. · If you are overweight, losing weight can help with many of the symptoms of PCOS. Talk to your doctor about safe ways to lose weight. · Get at least 30 minutes of exercise on most days of the week. Walking is a good choice. Or you can run, swim, cycle, or play tennis or team sports. · For hair growth you don't want, try bleaching, plucking, electrolysis, or laser therapy. · Acne can be treated with over-the-counter medicines. Look for ones that have benzoyl peroxide or salicylic acid in them. When should you call for help? Call your doctor now or seek immediate medical care if:    · You have severe vaginal bleeding.     · You have new or worse belly or pelvic pain.    Watch closely for changes in your health, and be sure to contact your doctor if:    · You do not get better as expected.     · You have unusual vaginal bleeding. Where can you learn more?   Go to http://amarjit-hipolito.info/. Enter T643 in the search box to learn more about \"Polycystic Ovary Syndrome: Care Instructions. \"  Current as of: February 19, 2019  Content Version: 12.2  © 2064-2430 [employer]. Care instructions adapted under license by [employer] (which disclaims liability or warranty for this information). If you have questions about a medical condition or this instruction, always ask your healthcare professional. [place] [address] any warranty or liability for your use of this information.
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
## 2022-03-18 PROBLEM — E78.1 HYPERTRIGLYCERIDEMIA: Status: ACTIVE | Noted: 2019-06-07

## 2022-03-18 PROBLEM — K59.00 CONSTIPATION: Status: ACTIVE | Noted: 2019-05-06

## 2022-03-19 PROBLEM — E53.8 VITAMIN B12 DEFICIENCY: Status: ACTIVE | Noted: 2019-12-04

## 2022-03-19 PROBLEM — N92.6 IRREGULAR PERIODS: Status: ACTIVE | Noted: 2019-04-15

## 2022-03-19 PROBLEM — E28.2 PCOS (POLYCYSTIC OVARIAN SYNDROME): Status: ACTIVE | Noted: 2017-09-29

## 2022-03-20 PROBLEM — R63.4 WEIGHT LOSS: Status: ACTIVE | Noted: 2019-12-04

## 2022-03-20 PROBLEM — Z76.89 ENCOUNTER FOR WEIGHT MANAGEMENT: Status: ACTIVE | Noted: 2019-12-04

## 2022-12-17 ENCOUNTER — HOSPITAL ENCOUNTER (EMERGENCY)
Age: 37
Discharge: HOME | End: 2022-12-17
Attending: EMERGENCY MEDICINE
Payer: COMMERCIAL

## 2022-12-17 ENCOUNTER — APPOINTMENT (OUTPATIENT)
Dept: GENERAL RADIOLOGY | Age: 37
End: 2022-12-17
Attending: STUDENT IN AN ORGANIZED HEALTH CARE EDUCATION/TRAINING PROGRAM
Payer: COMMERCIAL

## 2022-12-17 VITALS
HEIGHT: 67 IN | BODY MASS INDEX: 45.57 KG/M2 | SYSTOLIC BLOOD PRESSURE: 139 MMHG | TEMPERATURE: 98.1 F | DIASTOLIC BLOOD PRESSURE: 82 MMHG | WEIGHT: 290.35 LBS | HEART RATE: 107 BPM | RESPIRATION RATE: 16 BRPM | OXYGEN SATURATION: 98 %

## 2022-12-17 DIAGNOSIS — R07.9 ACUTE CHEST PAIN: Primary | ICD-10-CM

## 2022-12-17 DIAGNOSIS — R00.2 PALPITATIONS: ICD-10-CM

## 2022-12-17 LAB
ALBUMIN SERPL-MCNC: 3.7 G/DL (ref 3.5–5)
ALBUMIN/GLOB SERPL: 0.9 {RATIO} (ref 1.1–2.2)
ALP SERPL-CCNC: 76 U/L (ref 45–117)
ALT SERPL-CCNC: 34 U/L (ref 12–78)
ANION GAP SERPL CALC-SCNC: 7 MMOL/L (ref 5–15)
AST SERPL-CCNC: 15 U/L (ref 15–37)
BASOPHILS # BLD: 0 K/UL (ref 0–0.1)
BASOPHILS NFR BLD: 0 % (ref 0–1)
BILIRUB SERPL-MCNC: 0.4 MG/DL (ref 0.2–1)
BNP SERPL-MCNC: 28 PG/ML
BUN SERPL-MCNC: 17 MG/DL (ref 6–20)
BUN/CREAT SERPL: 18 (ref 12–20)
CALCIUM SERPL-MCNC: 9.6 MG/DL (ref 8.5–10.1)
CHLORIDE SERPL-SCNC: 102 MMOL/L (ref 97–108)
CO2 SERPL-SCNC: 28 MMOL/L (ref 21–32)
CREAT SERPL-MCNC: 0.97 MG/DL (ref 0.55–1.02)
DIFFERENTIAL METHOD BLD: ABNORMAL
EOSINOPHIL # BLD: 0.3 K/UL (ref 0–0.4)
EOSINOPHIL NFR BLD: 3 % (ref 0–7)
ERYTHROCYTE [DISTWIDTH] IN BLOOD BY AUTOMATED COUNT: 13.3 % (ref 11.5–14.5)
GLOBULIN SER CALC-MCNC: 3.9 G/DL (ref 2–4)
GLUCOSE SERPL-MCNC: 104 MG/DL (ref 65–100)
HCT VFR BLD AUTO: 39.4 % (ref 35–47)
HGB BLD-MCNC: 12.5 G/DL (ref 11.5–16)
IMM GRANULOCYTES # BLD AUTO: 0 K/UL (ref 0–0.04)
IMM GRANULOCYTES NFR BLD AUTO: 1 % (ref 0–0.5)
LYMPHOCYTES # BLD: 2.6 K/UL (ref 0.8–3.5)
LYMPHOCYTES NFR BLD: 30 % (ref 12–49)
MCH RBC QN AUTO: 27.4 PG (ref 26–34)
MCHC RBC AUTO-ENTMCNC: 31.7 G/DL (ref 30–36.5)
MCV RBC AUTO: 86.4 FL (ref 80–99)
MONOCYTES # BLD: 0.8 K/UL (ref 0–1)
MONOCYTES NFR BLD: 9 % (ref 5–13)
NEUTS SEG # BLD: 4.9 K/UL (ref 1.8–8)
NEUTS SEG NFR BLD: 57 % (ref 32–75)
NRBC # BLD: 0 K/UL (ref 0–0.01)
NRBC BLD-RTO: 0 PER 100 WBC
PLATELET # BLD AUTO: 235 K/UL (ref 150–400)
PMV BLD AUTO: 10.5 FL (ref 8.9–12.9)
POTASSIUM SERPL-SCNC: 3.4 MMOL/L (ref 3.5–5.1)
PROT SERPL-MCNC: 7.6 G/DL (ref 6.4–8.2)
RBC # BLD AUTO: 4.56 M/UL (ref 3.8–5.2)
SODIUM SERPL-SCNC: 137 MMOL/L (ref 136–145)
TROPONIN-HIGH SENSITIVITY: 6 NG/L (ref 0–51)
WBC # BLD AUTO: 8.6 K/UL (ref 3.6–11)

## 2022-12-17 PROCEDURE — 83880 ASSAY OF NATRIURETIC PEPTIDE: CPT

## 2022-12-17 PROCEDURE — 71046 X-RAY EXAM CHEST 2 VIEWS: CPT

## 2022-12-17 PROCEDURE — 36415 COLL VENOUS BLD VENIPUNCTURE: CPT

## 2022-12-17 PROCEDURE — 85025 COMPLETE CBC W/AUTO DIFF WBC: CPT

## 2022-12-17 PROCEDURE — 80053 COMPREHEN METABOLIC PANEL: CPT

## 2022-12-17 PROCEDURE — 93005 ELECTROCARDIOGRAM TRACING: CPT

## 2022-12-17 PROCEDURE — 84484 ASSAY OF TROPONIN QUANT: CPT

## 2022-12-17 PROCEDURE — 99285 EMERGENCY DEPT VISIT HI MDM: CPT

## 2022-12-18 LAB
ATRIAL RATE: 103 BPM
CALCULATED P AXIS, ECG09: 38 DEGREES
CALCULATED R AXIS, ECG10: 25 DEGREES
CALCULATED T AXIS, ECG11: 51 DEGREES
DIAGNOSIS, 93000: NORMAL
P-R INTERVAL, ECG05: 132 MS
Q-T INTERVAL, ECG07: 352 MS
QRS DURATION, ECG06: 92 MS
QTC CALCULATION (BEZET), ECG08: 461 MS
VENTRICULAR RATE, ECG03: 103 BPM

## 2022-12-18 NOTE — ED PROVIDER NOTES
EMERGENCY DEPARTMENT HISTORY AND PHYSICAL EXAM      Date: 12/17/2022  Patient Name: Aditi Dahl  Patient Age and Sex: 44 y.o. female     History of Presenting Illness     Chief Complaint   Patient presents with    Chest Pain     Chest was a grabbing feeling in the middle, with feeling lightheaded. This has been ongoing an hour; it happened last night but not as painful; adds that she has been having fluttering feeling as well. History Provided By: Patient    HPI: Aditi Dahl is a 26-year-old history dyslipidemia, on combination OCP, obesity presenting with transient episodes of chest pain. She reports over the past 1 to 2 months is intermittent episodes of chest fluttering. She was put on Pepcid by her PCP without real improvement her symptoms. Last 2 days she has had recurrence of this however much more severe. She describes a grabbing sensation in her substernal chest, lasted approximately 1 hour no radiation, some associated dyspnea with anxiety dizziness and palpitations. She is very tearful during these episodes. These spontaneously resolved. She had another episode tonight prompting her visit in the ED. She denies any ongoing symptoms. No leg swelling, no hemoptysis. There are no other complaints, changes, or physical findings at this time. PCP: Daina Lea NP    No current facility-administered medications on file prior to encounter. Current Outpatient Medications on File Prior to Encounter   Medication Sig Dispense Refill    fenofibrate nanocrystallized (TRICOR) 48 mg tablet TAKE 1 TAB BY MOUTH DAILY 30 Tab 0    levonorgestrel-ethinyl estradiol (Larissia) 0.1-20 mg-mcg tab Take 1 Tab by mouth daily. 3 Dose Pack 0    phentermine (ADIPEX-P) 37.5 mg tablet Take 1 Tab by mouth every morning.  Max Daily Amount: 37.5 mg. 60 Tab 0    metFORMIN ER (GLUCOPHAGE XR) 500 mg tablet TAKE 2 TABLETS BY MOUTH EVERY EVENING 180 Tab 1    albuterol (PROVENTIL HFA, VENTOLIN HFA, PROAIR HFA) 90 mcg/actuation inhaler Take 2 Puffs by inhalation every four (4) hours as needed for Wheezing or Shortness of Breath (cough). 1 Inhaler 2    busPIRone (BUSPAR) 7.5 mg tablet Take 1 tab daily x 1 week then increase to 1 tab BID 60 Tab 1    levothyroxine (SYNTHROID) 25 mcg tablet Take 1 Tab by mouth Daily (before breakfast). 90 Tab 1    cyanocobalamin (VITAMIN B12) 500 mcg tablet Take 1 Tab by mouth daily. 90 Tab 0    MULTIVITAMIN PO Take 1 Tab by mouth two (2) times a day.  dicyclomine (BENTYL) 10 mg capsule dicyclomine 10 mg capsule   Take 1 capsule twice a day by oral route.  aspirin/acetaminophen/caffeine (HEADACHE RELIEF, ASA-ACET-CAF, PO) Take  by mouth.  cholecalciferol (VITAMIN D3) 1,000 unit cap 1,000 Units daily.  senna (SENNA LAX) 8.6 mg tablet Senna Lax      docusate sodium (STOOL SOFTENER) 100 mg capsule Stool Softener      bran-gum-fib-dana-psyl-kelp-pec (FIBER 6) 1,000 mg tab fiber      fluticasone (FLONASE) 50 mcg/actuation nasal spray 2 Sprays by Both Nostrils route daily.  loratadine 10 mg cap Take  by mouth. Past History   Past Medical History:  Past Medical History:   Diagnosis Date    Abnormal Pap smear 07/07/08    Abnormal Pap smear of cervix 2012    saw OBGYN    Allergy, unspecified not elsewhere classified     Childhood asthma childhood    may need alb with URI    DUB (dysfunctional uterine bleeding) 2012, recurred ~ 2015    Dyslipidemia 4/7/2015    Family history of melanoma     Hand pain 2/26/2013    Bilaterally. 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  Hypothyroid 2009    Iron deficiency anemia 2/26/2013    Mononucleosis elementary, middle school, high school    PCOS (polycystic ovarian syndrome) 9/29/2017    PCOS (polycystic ovarian syndrome) 09/2017    Vertigo 8/25/2017    Prn meclizine.  Trigger sinus infection    Vitamin D deficiency 4/7/2015     Past Surgical History:  Past Surgical History:   Procedure Laterality Date  HX  SECTION  2009    HX TYMPANOSTOMY Bilateral infant    INSERT PICC LINE Left 2009    d/t abscess after      Family History:  Family History   Problem Relation Age of Onset    Hypertension Father     Stroke Father         TIA    Cancer Father         spinal    High Cholesterol Mother     Other Mother         prediabetes    Heart Disease Mother         irregular heartbeat    Endometriosis Mother     Cancer Maternal Grandmother         lung     Cancer Paternal Grandmother         lymphoma/ brain     Diabetes Paternal Grandmother     Heart Disease Paternal Grandmother     Heart Disease Maternal Grandfather     Cancer Maternal Grandfather         skin- melanoma    Hypertension Brother     Depression Brother     No Known Problems Son      Social History:  Social History     Tobacco Use    Smoking status: Former     Packs/day: 0.75     Years: 3.00     Pack years: 2.25     Types: Cigarettes     Quit date: 2005     Years since quittin.9    Smokeless tobacco: Never   Substance Use Topics    Alcohol use: Yes     Alcohol/week: 0.8 standard drinks     Types: 1 Glasses of wine per week     Comment: rare    Drug use: No     Allergies: Allergies   Allergen Reactions    Morphine Other (comments) and Seizures     Tremors     Review of Systems   Review of Systems   Constitutional:  Negative for chills and fever. HENT:  Negative for congestion and rhinorrhea. Respiratory:  Negative for shortness of breath. Cardiovascular:  Positive for chest pain and palpitations. Gastrointestinal:  Negative for abdominal pain, nausea and vomiting. Genitourinary:  Negative for dysuria and frequency. Musculoskeletal:  Negative for myalgias. Neurological:  Positive for dizziness. Psychiatric/Behavioral:  The patient is nervous/anxious. All other systems reviewed and are negative. Physical Exam   Physical Exam  Vitals and nursing note reviewed.    Constitutional: General: She is not in acute distress. Appearance: Normal appearance. She is well-developed. She is not ill-appearing. HENT:      Head: Normocephalic and atraumatic. Mouth/Throat:      Mouth: Mucous membranes are moist.   Eyes:      Conjunctiva/sclera: Conjunctivae normal.   Cardiovascular:      Rate and Rhythm: Normal rate and regular rhythm. Pulses: Normal pulses. Heart sounds: Normal heart sounds. No murmur heard. Pulmonary:      Effort: Pulmonary effort is normal.      Breath sounds: Normal breath sounds. Abdominal:      General: Abdomen is flat. Palpations: Abdomen is soft. Musculoskeletal:         General: No deformity. Skin:     General: Skin is warm and dry. Neurological:      Mental Status: She is alert and oriented to person, place, and time. Mental status is at baseline. Psychiatric:         Behavior: Behavior normal.         Thought Content:  Thought content normal.      Diagnostic Study Results     Labs -     Recent Results (from the past 12 hour(s))   EKG, 12 LEAD, INITIAL    Collection Time: 12/17/22  6:48 PM   Result Value Ref Range    Ventricular Rate 103 BPM    Atrial Rate 103 BPM    P-R Interval 132 ms    QRS Duration 92 ms    Q-T Interval 352 ms    QTC Calculation (Bezet) 461 ms    Calculated P Axis 38 degrees    Calculated R Axis 25 degrees    Calculated T Axis 51 degrees    Diagnosis       ** Poor data quality, interpretation may be adversely affected  Sinus tachycardia  No previous ECGs available     CBC WITH AUTOMATED DIFF    Collection Time: 12/17/22  6:52 PM   Result Value Ref Range    WBC 8.6 3.6 - 11.0 K/uL    RBC 4.56 3.80 - 5.20 M/uL    HGB 12.5 11.5 - 16.0 g/dL    HCT 39.4 35.0 - 47.0 %    MCV 86.4 80.0 - 99.0 FL    MCH 27.4 26.0 - 34.0 PG    MCHC 31.7 30.0 - 36.5 g/dL    RDW 13.3 11.5 - 14.5 %    PLATELET 836 681 - 611 K/uL    MPV 10.5 8.9 - 12.9 FL    NRBC 0.0 0  WBC    ABSOLUTE NRBC 0.00 0.00 - 0.01 K/uL    NEUTROPHILS 57 32 - 75 % LYMPHOCYTES 30 12 - 49 %    MONOCYTES 9 5 - 13 %    EOSINOPHILS 3 0 - 7 %    BASOPHILS 0 0 - 1 %    IMMATURE GRANULOCYTES 1 (H) 0.0 - 0.5 %    ABS. NEUTROPHILS 4.9 1.8 - 8.0 K/UL    ABS. LYMPHOCYTES 2.6 0.8 - 3.5 K/UL    ABS. MONOCYTES 0.8 0.0 - 1.0 K/UL    ABS. EOSINOPHILS 0.3 0.0 - 0.4 K/UL    ABS. BASOPHILS 0.0 0.0 - 0.1 K/UL    ABS. IMM. GRANS. 0.0 0.00 - 0.04 K/UL    DF AUTOMATED     METABOLIC PANEL, COMPREHENSIVE    Collection Time: 12/17/22  6:52 PM   Result Value Ref Range    Sodium 137 136 - 145 mmol/L    Potassium 3.4 (L) 3.5 - 5.1 mmol/L    Chloride 102 97 - 108 mmol/L    CO2 28 21 - 32 mmol/L    Anion gap 7 5 - 15 mmol/L    Glucose 104 (H) 65 - 100 mg/dL    BUN 17 6 - 20 MG/DL    Creatinine 0.97 0.55 - 1.02 MG/DL    BUN/Creatinine ratio 18 12 - 20      eGFR >60 >60 ml/min/1.73m2    Calcium 9.6 8.5 - 10.1 MG/DL    Bilirubin, total 0.4 0.2 - 1.0 MG/DL    ALT (SGPT) 34 12 - 78 U/L    AST (SGOT) 15 15 - 37 U/L    Alk. phosphatase 76 45 - 117 U/L    Protein, total 7.6 6.4 - 8.2 g/dL    Albumin 3.7 3.5 - 5.0 g/dL    Globulin 3.9 2.0 - 4.0 g/dL    A-G Ratio 0.9 (L) 1.1 - 2.2     NT-PRO BNP    Collection Time: 12/17/22  6:52 PM   Result Value Ref Range    NT pro-BNP 28 <125 PG/ML   TROPONIN-HIGH SENSITIVITY    Collection Time: 12/17/22  6:52 PM   Result Value Ref Range    Troponin-High Sensitivity 6 0 - 51 ng/L       Radiologic Studies -   XR CHEST PA LAT   Final Result   Clear lungs. CT Results  (Last 48 hours)      None          CXR Results  (Last 48 hours)                 12/17/22 1928  XR CHEST PA LAT Final result    Impression:  Clear lungs. Narrative:  PA AND LATERAL CHEST RADIOGRAPHS: 12/17/2022 7:28 PM       INDICATION: Chest pain. COMPARISON: None. TECHNIQUE: Frontal and lateral radiographs of the chest.       FINDINGS:   The lungs are clear. The central airways are patent. The heart size is normal.   No pneumothorax or pleural effusion.                      Medical Decision Making   I am the first provider for this patient. I reviewed the vital signs, available nursing notes, past medical history, past surgical history, family history and social history. Vital Signs-Reviewed the patient's vital signs. Patient Vitals for the past 12 hrs:   Temp Pulse Resp BP SpO2   12/17/22 1841 98.1 °F (36.7 °C) (!) 107 16 139/82 98 %       Records Reviewed: Nursing Notes and Old Medical Records    Provider Notes (Medical Decision Making):   DDx ACS, dysrhythmia, consider PE. Obtain EKG troponin CBC CMP. I did offer D-dimer to evaluate for PE. I did discuss the possibility of PE with the patient, given the transient episodes, patient now without any symptoms I think this is unlikely. I offered D-dimer to screen and patient refused further testing for PE. I think this is quite unlikely and it is reasonable to give strict return precautions. ED Course:   Initial assessment performed. The patients presenting problems have been discussed, and they are in agreement with the care plan formulated and outlined with them. I have encouraged them to ask questions as they arise throughout their visit. ED Course as of 12/17/22 2201   Sat Dec 17, 2022   2150 Troponin 6 BNP 28 CMP is unremarkable CBC is unremarkable chest x-ray is negative acute cardiopulmonary process, tachycardia on arrival no tachypnea no hypoxia no tachycardia time of my evaluation [WB]   2151 EG shows a sinus tachycardia 103 normal axis, normal intervals no STEMI no peak T waves [WB]      ED Course User Index  [WB] Maximiliano Ott MD     Critical Care Time:   0    Disposition:  Discharge Note:  The patient has been re-evaluated and is ready for discharge. Reviewed available results with patient. Counseled patient on diagnosis and care plan. Patient has expressed understanding, and all questions have been answered.  Patient agrees with plan and agrees to follow up as recommended, or to return to the ED if their symptoms worsen. Discharge instructions have been provided and explained to the patient, along with reasons to return to the ED. PLAN:  Current Discharge Medication List        2. Follow-up Information       Follow up With Specialties Details Why Florida Martinez MD Interventional Cardiology Physician, Cardiovascular Disease Physician, Cardio Vascular Surgery   [address] [facility]  [phone]      Jose Manuel Vela MD [address] [place] [address] Vascular Surgery, Interventional Cardiology Physician, Cardiovascular Disease Physician   [address]  [address]  [phone]      [facility] EMERGENCY DEPT Emergency Medicine   [address] [facility]  [address]   [address] [address]  [phone]          3. Return to ED if worse     Diagnosis     Clinical Impression:   1. Acute chest pain    2. Palpitations        Attestations:    Miles Butts M.D. Please note that this dictation was completed with [employer], the computer voice recognition software. Quite often unanticipated grammatical, syntax, homophones, and other interpretive errors are inadvertently transcribed by the computer software. Please disregard these errors. Please excuse any errors that have escaped final proofreading. Thank you.

## 2024-08-12 NOTE — PROGRESS NOTES
Will review results in detail at upcoming office visit. 9/29/2017  4:20 PM    Thong Kurtz MD       [address] [place] [address] yes